# Patient Record
Sex: FEMALE | Race: WHITE
[De-identification: names, ages, dates, MRNs, and addresses within clinical notes are randomized per-mention and may not be internally consistent; named-entity substitution may affect disease eponyms.]

---

## 2017-02-20 ENCOUNTER — RECORDS - HEALTHEAST (OUTPATIENT)
Dept: ADMINISTRATIVE | Facility: OTHER | Age: 32
End: 2017-02-20

## 2017-10-11 ENCOUNTER — RECORDS - HEALTHEAST (OUTPATIENT)
Dept: ADMINISTRATIVE | Facility: OTHER | Age: 32
End: 2017-10-11

## 2018-02-07 ENCOUNTER — COMMUNICATION - HEALTHEAST (OUTPATIENT)
Dept: ADMINISTRATIVE | Facility: CLINIC | Age: 33
End: 2018-02-07

## 2018-03-08 ENCOUNTER — OFFICE VISIT - HEALTHEAST (OUTPATIENT)
Dept: ENDOCRINOLOGY | Facility: CLINIC | Age: 33
End: 2018-03-08

## 2018-03-08 DIAGNOSIS — E03.9 HYPOTHYROID: ICD-10-CM

## 2018-03-08 ASSESSMENT — MIFFLIN-ST. JEOR: SCORE: 1465.84

## 2018-03-09 ENCOUNTER — AMBULATORY - HEALTHEAST (OUTPATIENT)
Dept: ENDOCRINOLOGY | Facility: CLINIC | Age: 33
End: 2018-03-09

## 2018-03-09 DIAGNOSIS — E03.9 HYPOTHYROID: ICD-10-CM

## 2018-04-26 ENCOUNTER — COMMUNICATION - HEALTHEAST (OUTPATIENT)
Dept: ADMINISTRATIVE | Facility: CLINIC | Age: 33
End: 2018-04-26

## 2018-05-14 ENCOUNTER — COMMUNICATION - HEALTHEAST (OUTPATIENT)
Dept: TELEHEALTH | Facility: CLINIC | Age: 33
End: 2018-05-14

## 2018-05-14 ENCOUNTER — COMMUNICATION - HEALTHEAST (OUTPATIENT)
Dept: ENDOCRINOLOGY | Facility: CLINIC | Age: 33
End: 2018-05-14

## 2018-05-14 ENCOUNTER — OFFICE VISIT - HEALTHEAST (OUTPATIENT)
Dept: ENDOCRINOLOGY | Facility: CLINIC | Age: 33
End: 2018-05-14

## 2018-05-14 DIAGNOSIS — E03.9 HYPOTHYROID: ICD-10-CM

## 2018-05-14 LAB
CALCIUM SERPL-MCNC: 9.8 MG/DL (ref 8.5–10.5)
CREAT SERPL-MCNC: 0.65 MG/DL (ref 0.6–1.1)
GFR SERPL CREATININE-BSD FRML MDRD: >60 ML/MIN/1.73M2
HCG UR QL: NEGATIVE
POTASSIUM BLD-SCNC: 4 MMOL/L (ref 3.5–5)
SP GR UR STRIP: 1.01 (ref 1–1.03)
T3 SERPL-MCNC: 77 NG/DL (ref 45–175)
T4 FREE SERPL-MCNC: 0.7 NG/DL (ref 0.7–1.8)
TSH SERPL DL<=0.005 MIU/L-ACNC: 12 UIU/ML (ref 0.3–5)

## 2018-05-14 ASSESSMENT — MIFFLIN-ST. JEOR: SCORE: 1448.15

## 2018-05-15 ENCOUNTER — COMMUNICATION - HEALTHEAST (OUTPATIENT)
Dept: ENDOCRINOLOGY | Facility: CLINIC | Age: 33
End: 2018-05-15

## 2018-05-15 LAB — 25(OH)D3 SERPL-MCNC: 28.6 NG/ML (ref 30–80)

## 2018-05-16 ENCOUNTER — COMMUNICATION - HEALTHEAST (OUTPATIENT)
Dept: ENDOCRINOLOGY | Facility: CLINIC | Age: 33
End: 2018-05-16

## 2018-05-23 ENCOUNTER — COMMUNICATION - HEALTHEAST (OUTPATIENT)
Dept: ENDOCRINOLOGY | Facility: CLINIC | Age: 33
End: 2018-05-23

## 2018-05-23 DIAGNOSIS — E03.9 HYPOTHYROID: ICD-10-CM

## 2018-05-24 ENCOUNTER — COMMUNICATION - HEALTHEAST (OUTPATIENT)
Dept: ENDOCRINOLOGY | Facility: CLINIC | Age: 33
End: 2018-05-24

## 2018-05-25 ENCOUNTER — COMMUNICATION - HEALTHEAST (OUTPATIENT)
Dept: ENDOCRINOLOGY | Facility: CLINIC | Age: 33
End: 2018-05-25

## 2018-05-29 ENCOUNTER — AMBULATORY - HEALTHEAST (OUTPATIENT)
Dept: ENDOCRINOLOGY | Facility: CLINIC | Age: 33
End: 2018-05-29

## 2018-05-29 DIAGNOSIS — F43.10 PTSD (POST-TRAUMATIC STRESS DISORDER): ICD-10-CM

## 2018-05-30 ENCOUNTER — AMBULATORY - HEALTHEAST (OUTPATIENT)
Dept: ENDOCRINOLOGY | Facility: CLINIC | Age: 33
End: 2018-05-30

## 2018-05-30 DIAGNOSIS — F43.10 PTSD (POST-TRAUMATIC STRESS DISORDER): ICD-10-CM

## 2018-05-31 ENCOUNTER — AMBULATORY - HEALTHEAST (OUTPATIENT)
Dept: ENDOCRINOLOGY | Facility: CLINIC | Age: 33
End: 2018-05-31

## 2018-06-21 ENCOUNTER — COMMUNICATION - HEALTHEAST (OUTPATIENT)
Dept: ENDOCRINOLOGY | Facility: CLINIC | Age: 33
End: 2018-06-21

## 2018-06-26 ENCOUNTER — COMMUNICATION - HEALTHEAST (OUTPATIENT)
Dept: ADMINISTRATIVE | Facility: CLINIC | Age: 33
End: 2018-06-26

## 2018-06-28 ENCOUNTER — OFFICE VISIT - HEALTHEAST (OUTPATIENT)
Dept: ENDOCRINOLOGY | Facility: CLINIC | Age: 33
End: 2018-06-28

## 2018-06-28 ENCOUNTER — HOSPITAL ENCOUNTER (OUTPATIENT)
Dept: ULTRASOUND IMAGING | Facility: CLINIC | Age: 33
Discharge: HOME | End: 2018-06-28
Attending: INTERNAL MEDICINE

## 2018-06-28 DIAGNOSIS — E03.9 HYPOTHYROID: ICD-10-CM

## 2018-06-28 LAB
CALCIUM SERPL-MCNC: 10 MG/DL (ref 8.5–10.5)
CREAT SERPL-MCNC: 0.75 MG/DL (ref 0.6–1.1)
GFR SERPL CREATININE-BSD FRML MDRD: >60 ML/MIN/1.73M2
POTASSIUM BLD-SCNC: 4.4 MMOL/L (ref 3.5–5)
T3 SERPL-MCNC: 71 NG/DL (ref 45–175)
T4 FREE SERPL-MCNC: 0.6 NG/DL (ref 0.7–1.8)
THYROID PEROXIDASE ANTIBODIES - HISTORICAL: 22.3 IU/ML (ref 0–5.6)
TSH SERPL DL<=0.005 MIU/L-ACNC: 28.97 UIU/ML (ref 0.3–5)

## 2018-06-28 ASSESSMENT — MIFFLIN-ST. JEOR: SCORE: 1416.86

## 2018-06-29 LAB
25(OH)D3 SERPL-MCNC: 30.1 NG/ML (ref 30–80)
25(OH)D3 SERPL-MCNC: 30.1 NG/ML (ref 30–80)

## 2018-06-30 LAB
THYROGLOB AB SERPL-ACNC: <0.9 IU/ML (ref 0–4)
THYROGLOB SERPL-MCNC: ABNORMAL NG/ML (ref 1.3–31.8)
THYROGLOBULIN, SERUM OR: 0.4 NG/ML (ref 1.3–31.8)
TSH RECEP AB SER-ACNC: <0.9 IU/L

## 2018-07-03 LAB — TSI ACT/NOR SER: 100 %

## 2018-07-06 ENCOUNTER — COMMUNICATION - HEALTHEAST (OUTPATIENT)
Dept: ENDOCRINOLOGY | Facility: CLINIC | Age: 33
End: 2018-07-06

## 2018-07-06 ENCOUNTER — AMBULATORY - HEALTHEAST (OUTPATIENT)
Dept: ENDOCRINOLOGY | Facility: CLINIC | Age: 33
End: 2018-07-06

## 2018-07-06 DIAGNOSIS — E03.9 HYPOTHYROID: ICD-10-CM

## 2018-07-06 DIAGNOSIS — E05.90 HYPERTHYROIDISM: ICD-10-CM

## 2018-07-13 ENCOUNTER — HOSPITAL ENCOUNTER (OUTPATIENT)
Dept: ULTRASOUND IMAGING | Facility: CLINIC | Age: 33
Discharge: HOME | End: 2018-07-13
Attending: INTERNAL MEDICINE

## 2018-07-13 DIAGNOSIS — E05.90 HYPERTHYROIDISM: ICD-10-CM

## 2018-07-31 ENCOUNTER — COMMUNICATION - HEALTHEAST (OUTPATIENT)
Dept: ENDOCRINOLOGY | Facility: CLINIC | Age: 33
End: 2018-07-31

## 2018-08-28 ENCOUNTER — COMMUNICATION - HEALTHEAST (OUTPATIENT)
Dept: ENDOCRINOLOGY | Facility: CLINIC | Age: 33
End: 2018-08-28

## 2018-08-28 DIAGNOSIS — E03.9 HYPOTHYROID: ICD-10-CM

## 2018-09-04 ENCOUNTER — AMBULATORY - HEALTHEAST (OUTPATIENT)
Dept: LAB | Facility: CLINIC | Age: 33
End: 2018-09-04

## 2018-09-04 DIAGNOSIS — E03.9 HYPOTHYROID: ICD-10-CM

## 2018-09-04 LAB
T3 SERPL-MCNC: 115 NG/DL (ref 45–175)
T4 FREE SERPL-MCNC: 0.9 NG/DL (ref 0.7–1.8)
TSH SERPL DL<=0.005 MIU/L-ACNC: 6.37 UIU/ML (ref 0.3–5)

## 2018-09-12 ENCOUNTER — COMMUNICATION - HEALTHEAST (OUTPATIENT)
Dept: ENDOCRINOLOGY | Facility: CLINIC | Age: 33
End: 2018-09-12

## 2018-09-12 DIAGNOSIS — E03.9 HYPOTHYROID: ICD-10-CM

## 2018-09-27 ENCOUNTER — COMMUNICATION - HEALTHEAST (OUTPATIENT)
Dept: ENDOCRINOLOGY | Facility: CLINIC | Age: 33
End: 2018-09-27

## 2018-09-27 DIAGNOSIS — E03.9 HYPOTHYROID: ICD-10-CM

## 2018-11-12 ENCOUNTER — COMMUNICATION - HEALTHEAST (OUTPATIENT)
Dept: ENDOCRINOLOGY | Facility: CLINIC | Age: 33
End: 2018-11-12

## 2018-11-12 DIAGNOSIS — E03.9 HYPOTHYROID: ICD-10-CM

## 2018-11-13 ENCOUNTER — COMMUNICATION - HEALTHEAST (OUTPATIENT)
Dept: ADMINISTRATIVE | Facility: CLINIC | Age: 33
End: 2018-11-13

## 2018-11-14 ENCOUNTER — COMMUNICATION - HEALTHEAST (OUTPATIENT)
Dept: LAB | Facility: CLINIC | Age: 33
End: 2018-11-14

## 2018-11-14 DIAGNOSIS — E03.9 HYPOTHYROID: ICD-10-CM

## 2018-11-20 ENCOUNTER — AMBULATORY - HEALTHEAST (OUTPATIENT)
Dept: LAB | Facility: CLINIC | Age: 33
End: 2018-11-20

## 2018-11-20 DIAGNOSIS — E03.9 HYPOTHYROID: ICD-10-CM

## 2018-11-20 LAB
T3 SERPL-MCNC: 123 NG/DL (ref 45–175)
T4 FREE SERPL-MCNC: 0.8 NG/DL (ref 0.7–1.8)
TSH SERPL DL<=0.005 MIU/L-ACNC: 7.02 UIU/ML (ref 0.3–5)

## 2018-11-21 LAB
25(OH)D3 SERPL-MCNC: 25.6 NG/ML (ref 30–80)
25(OH)D3 SERPL-MCNC: 25.6 NG/ML (ref 30–80)

## 2018-11-27 ENCOUNTER — COMMUNICATION - HEALTHEAST (OUTPATIENT)
Dept: ADMINISTRATIVE | Facility: CLINIC | Age: 33
End: 2018-11-27

## 2018-12-06 ENCOUNTER — COMMUNICATION - HEALTHEAST (OUTPATIENT)
Dept: ENDOCRINOLOGY | Facility: CLINIC | Age: 33
End: 2018-12-06

## 2018-12-06 DIAGNOSIS — E03.9 HYPOTHYROID: ICD-10-CM

## 2018-12-31 ENCOUNTER — OFFICE VISIT - HEALTHEAST (OUTPATIENT)
Dept: ENDOCRINOLOGY | Facility: CLINIC | Age: 33
End: 2018-12-31

## 2018-12-31 DIAGNOSIS — E03.9 ACQUIRED HYPOTHYROIDISM: ICD-10-CM

## 2018-12-31 LAB
T3 SERPL-MCNC: 105 NG/DL (ref 45–175)
T4 FREE SERPL-MCNC: 0.6 NG/DL (ref 0.7–1.8)
TSH SERPL DL<=0.005 MIU/L-ACNC: 28.47 UIU/ML (ref 0.3–5)

## 2018-12-31 ASSESSMENT — MIFFLIN-ST. JEOR: SCORE: 1482.41

## 2019-01-03 ENCOUNTER — COMMUNICATION - HEALTHEAST (OUTPATIENT)
Dept: ENDOCRINOLOGY | Facility: CLINIC | Age: 34
End: 2019-01-03

## 2019-01-03 DIAGNOSIS — E03.9 ACQUIRED HYPOTHYROIDISM: ICD-10-CM

## 2019-02-26 ENCOUNTER — COMMUNICATION - HEALTHEAST (OUTPATIENT)
Dept: SCHEDULING | Facility: CLINIC | Age: 34
End: 2019-02-26

## 2019-04-26 ENCOUNTER — HOSPITAL ENCOUNTER (OUTPATIENT)
Dept: LAB | Age: 34
Setting detail: SPECIMEN
Discharge: HOME | End: 2019-04-26

## 2019-04-26 ENCOUNTER — AMBULATORY - HEALTHEAST (OUTPATIENT)
Dept: LAB | Facility: CLINIC | Age: 34
End: 2019-04-26

## 2019-04-26 DIAGNOSIS — E03.9 ACQUIRED HYPOTHYROIDISM: ICD-10-CM

## 2019-04-26 LAB
T3 SERPL-MCNC: 102 NG/DL (ref 45–175)
T4 FREE SERPL-MCNC: 1 NG/DL (ref 0.7–1.8)
TSH SERPL DL<=0.005 MIU/L-ACNC: 8.15 UIU/ML (ref 0.3–5)

## 2019-04-29 ENCOUNTER — COMMUNICATION - HEALTHEAST (OUTPATIENT)
Dept: ENDOCRINOLOGY | Facility: CLINIC | Age: 34
End: 2019-04-29

## 2019-04-29 DIAGNOSIS — E03.8 OTHER SPECIFIED HYPOTHYROIDISM: ICD-10-CM

## 2019-06-04 ENCOUNTER — COMMUNICATION - HEALTHEAST (OUTPATIENT)
Dept: ENDOCRINOLOGY | Facility: CLINIC | Age: 34
End: 2019-06-04

## 2019-06-13 ENCOUNTER — TELEPHONE (OUTPATIENT)
Dept: ENDOCRINOLOGY | Facility: CLINIC | Age: 34
End: 2019-06-13

## 2019-06-13 NOTE — TELEPHONE ENCOUNTER
To schedulers: Please schedule  for lst available endocrine    Dior Benjamin MD  Endocrine triage      [facility] Call Center    Phone Message    May a detailed message be left on voicemail: yes    Reason for Call: Other: DX: Grave's disease and Dr. Sebastian Thompson from [facility] was referring pt, as he is out on an extended leave.  Referral and records were faxed, per pt.  Please follow up with pt.  Thank you.     Action Taken: Message routed to:  [facility] ([facility]): [facility]

## 2019-07-24 ENCOUNTER — DOCUMENTATION ONLY (OUTPATIENT)
Dept: CARE COORDINATION | Facility: CLINIC | Age: 34
End: 2019-07-24

## 2019-07-27 NOTE — TELEPHONE ENCOUNTER
RECORDS RECEIVED FROM: [facility], pt was followed by outside Endo and now referring out   DATE RECEIVED: 8-7   NOTES (FOR ALL VISITS) STATUS DETAILS   OFFICE NOTES from referring provider [facility] 6-4-19 telephone encounter   OFFICE NOTES from other specialist [facility] 12-31-18   ED NOTES [facility] 9-17-11   OPERATIVE REPORT  (thyroid, pituitary, adrenal, parathyroid) N/A    MEDICATION LIST N/A    IMAGING      DEXASCAN Received [facility]:  DX Bone Density 8/7/12   MRI (BRAIN) N/A    XR (Chest) Received [facility]:  XR Chest 9/17/11   CT (HEAD/NECK/CHEST/ABDOMEN) N/A    NUCLEAR  Received [facility]:  NM Thyroid Uptake 8/5/11   ULTRASOUND (HEAD/NECK) Received  [facility]:  US Thyroid 6/28/18  US Neck 7/17/18   LABS     DIABETES: HBGA1C, CREATININE, FASTING LIPIDS, MICROALBUMIN URINE, POTASSIUM, TSH, T4    THYROID: TSH, T4, CBC, THYRODLONULIN, TOTAL T3, FREE T4, CALCITONIN, CEA [facility] Everywhere   4-26-19        Action    Action Taken 7-27: Requested DXA 8-7-12 from [facility]  7-27: Requested CXR 9-18-1 from [facility]  7-27: Requested NM Thyroid 8-5-11 from [facility]  7-27: Requested US neck 7-17-18 from   7-27: Requested US thyroid 6-28-18 from

## 2019-08-06 NOTE — TELEPHONE ENCOUNTER
Phone Call:     [facility]   Outcome Called to have images pushed. Per Ashley, images were already pushed on 7/29 however we did not receive them. Ashley will re-push images

## 2019-08-07 ENCOUNTER — PRE VISIT (OUTPATIENT)
Dept: ENDOCRINOLOGY | Facility: CLINIC | Age: 34
End: 2019-08-07

## 2019-08-07 ENCOUNTER — OFFICE VISIT (OUTPATIENT)
Dept: ENDOCRINOLOGY | Facility: CLINIC | Age: 34
End: 2019-08-07
Payer: COMMERCIAL

## 2019-08-07 ENCOUNTER — RECORDS - HEALTHEAST (OUTPATIENT)
Dept: ADMINISTRATIVE | Facility: OTHER | Age: 34
End: 2019-08-07

## 2019-08-07 DIAGNOSIS — F32.A DEPRESSION, UNSPECIFIED DEPRESSION TYPE: ICD-10-CM

## 2019-08-07 DIAGNOSIS — Z86.39 HISTORY OF GRAVES' DISEASE: ICD-10-CM

## 2019-08-07 DIAGNOSIS — R53.83 FATIGUE, UNSPECIFIED TYPE: ICD-10-CM

## 2019-08-07 DIAGNOSIS — F32.81 PREMENSTRUAL DYSPHORIC DISORDER: ICD-10-CM

## 2019-08-07 DIAGNOSIS — E89.0 POSTABLATIVE HYPOTHYROIDISM: ICD-10-CM

## 2019-08-07 DIAGNOSIS — E89.0 POSTABLATIVE HYPOTHYROIDISM: Primary | ICD-10-CM

## 2019-08-07 LAB
CALCIUM SERPL-MCNC: 9.2 MG/DL (ref 8.5–10.1)
CORTIS SERPL-MCNC: 14.1 UG/DL (ref 4–22)
FERRITIN SERPL-MCNC: 71 NG/ML (ref 12–150)
HBA1C MFR BLD: 5.5 % (ref 0–5.6)
HBA1C MFR BLD: 5.5 % (ref 0–5.6)
T4 FREE SERPL-MCNC: 0.94 NG/DL (ref 0.76–1.46)
TSH SERPL DL<=0.005 MIU/L-ACNC: 28.65 MU/L (ref 0.4–4)

## 2019-08-07 RX ORDER — SACCHAROMYCES BOULARDII 250 MG
250 CAPSULE ORAL DAILY
COMMUNITY

## 2019-08-07 RX ORDER — LEVONORGESTREL/ETHIN.ESTRADIOL 0.1-0.02MG
1 TABLET ORAL
COMMUNITY
Start: 2019-06-18

## 2019-08-07 RX ORDER — ARIPIPRAZOLE 2 MG/1
2 TABLET ORAL
COMMUNITY
End: 2019-12-10

## 2019-08-07 RX ORDER — LEVOTHYROXINE SODIUM 125 UG/1
TABLET ORAL
COMMUNITY
Start: 2019-02-13 | End: 2019-08-08 | Stop reason: DRUGHIGH

## 2019-08-07 ASSESSMENT — ENCOUNTER SYMPTOMS
POLYDIPSIA: 1
BOWEL INCONTINENCE: 0
POOR WOUND HEALING: 0
NECK PAIN: 0
DIZZINESS: 1
POLYPHAGIA: 1
SNORES LOUDLY: 1
COUGH: 0
TREMORS: 0
TROUBLE SWALLOWING: 1
SPUTUM PRODUCTION: 0
NAIL CHANGES: 0
NIGHT SWEATS: 1
HOARSE VOICE: 1
FATIGUE: 1
EYE REDNESS: 1
RECTAL PAIN: 0
BLOOD IN STOOL: 1
ABDOMINAL PAIN: 1
NUMBNESS: 0
PARALYSIS: 0
VOMITING: 0
LOSS OF CONSCIOUSNESS: 0
HEMOPTYSIS: 0
HEADACHES: 1
MEMORY LOSS: 1
TASTE DISTURBANCE: 1
JAUNDICE: 0
MUSCLE WEAKNESS: 1
NECK MASS: 0
HALLUCINATIONS: 0
FEVER: 0
INSOMNIA: 1
DEPRESSION: 1
CHILLS: 0
JOINT SWELLING: 0
DIFFICULTY URINATING: 1
BLOATING: 1
SINUS CONGESTION: 1
SKIN CHANGES: 1
ARTHRALGIAS: 1
WEIGHT GAIN: 1
INCREASED ENERGY: 1
DIARRHEA: 1
CONSTIPATION: 1
DISTURBANCES IN COORDINATION: 1
HEMATURIA: 0
EYE IRRITATION: 1
DYSPNEA ON EXERTION: 1
DECREASED CONCENTRATION: 1
SHORTNESS OF BREATH: 1
SPEECH CHANGE: 0
SORE THROAT: 1
MYALGIAS: 1
ALTERED TEMPERATURE REGULATION: 1
NERVOUS/ANXIOUS: 1
EYE WATERING: 1
HEARTBURN: 1
EYE PAIN: 0
SMELL DISTURBANCE: 1
NAUSEA: 1
SINUS PAIN: 1
WHEEZING: 1
DOUBLE VISION: 0
PANIC: 1
COUGH DISTURBING SLEEP: 0
DECREASED LIBIDO: 1
DECREASED APPETITE: 0
STIFFNESS: 1
BACK PAIN: 0
MUSCLE CRAMPS: 1
SEIZURES: 0
WEAKNESS: 1
DYSURIA: 1
HOT FLASHES: 1
FLANK PAIN: 0
TINGLING: 1
WEIGHT LOSS: 0
POSTURAL DYSPNEA: 1

## 2019-08-07 ASSESSMENT — PATIENT HEALTH QUESTIONNAIRE - PHQ9: SUM OF ALL RESPONSES TO PHQ QUESTIONS 1-9: 16

## 2019-08-07 ASSESSMENT — PAIN SCALES - GENERAL: PAINLEVEL: MODERATE PAIN (4)

## 2019-08-07 ASSESSMENT — MIFFLIN-ST. JEOR: SCORE: 1484.7

## 2019-08-07 NOTE — TELEPHONE ENCOUNTER
Imaging Received  [facility]   Image Type (x): Disc:   PACS: x   Exam Date/Name US Thyroid 6/28/18  US Neck 7/17/18 Comments: Images resovled in PACS

## 2019-08-07 NOTE — PATIENT INSTRUCTIONS
I recommend you schedule a follow-up with Dr Ortez.   I will send the results through [facility]    Information for patients on Levo-thyroxine      The thyroid hormone, Levo-thyroxine (L-T4) is one of the main hormones normally produced by the thyroid.  The drug is identical in chemical structure to the natural product.  Levothyroxine is used to treat hypothyroidism (underactive thyroid).  Sometimes it is used even when the thyroid is not underactive, to treat a goiter (enlarged thyroid) or a thyroid nodule.  For patients with a history of thyroid removal, L-T4 is used to replace the deficiency created by the surgery.    The purpose of giving L-T4 to treat hypothyroidism is to make up for the thyroid hormone previously produced by your thyroid before it failed.  Depending on the extent of your thyroid failure, you may require a higher or a lower dose of L-T4.  The goal is to make your blood level of TSH (a hormone made by the master gland, the pituitary, the gland which controls and judges the thyroid) normal.    This drug is usually well-tolerated and safe but it is important to take the proper dose for YOU.  This involves periodic measurements of TSH, usually within 1-2 months of a dose change.  You should be off biotin containing supplements for at least one week prior to thyroid blood tests.    You should alert your doctor if you have signs you are getting too much L-T4.  These include excessive nervousness, heart fluttering or skipping beats, diarrhea, or feeling too hot and/or sweaty.      There are many brand names for Levo-thyroxine (L-T4), including Synthroid, Levoxyl, Levothroid, Unithroid, Tirosint and others.  Changing from one brand name thyroid hormone product to another or to a generic product (all generics are called levothyroxine) can cause changes in your thyroid hormone balance, even if the dose stays the same.  Since generic products can come from many different companies (such as [employer], [employer],  [employer] and others), there may be less consistency among the different generic preparations.  The decision to change brands or to change to a generic product must be made with your physician or other caregiver.  Follow-up testing should be arranged to monitor for any needed adjustments.  Monitoring may need to be more frequent if you always receive a generic thyroid hormone product.    For proper thyroid hormone balance the dose of thyroid hormone in your pills must be precise and consistent.    Be sure to take the medication as prescribed on an empty stomach, and at least 4 hours apart from calcium supplements, iron and soy products.  Store the medication away from severe heat and humidity.    You should be OFF any biotin containing supplements at least 7 days prior to thyroid lab draws.       Many insurance companies and other providers charge higher co-payments for brand name medications.  Since brand name L-T4 is not very expensive, be sure to ask if the actual cost of buying the medication is less than the co-payment.  In some instances the charge for a co-payment may be greater than buying the drug outright, especially if the prescription needs to be refilled every 30 days.    Using a pill tray can help you keep track of your medication.  Specifically, if you get a pill tray that has all days of the week (Sunday-Saturday) and also 4 boxes for each day (often labeled as breafkast, lunch, dinner and bedtime) you can use the box to fill your once/day  pills for a whole month.  If you miss a levothyroxine or vitamin D dose you can take 2 the next day.        For future reference:  Normal thyroid levels are important for pregnancy.    You should confirm normal thyroid levels immediately upon diagnosis of pregnancy.    Levothyroxine dose requirements often increase with pregnancy.    It is routine to check thyroid levels frequently during pregnancy    ________________    Thank you for choosing [facility] for your health care needs. Below is some information for patients who are interested in having their follow-up visit with a physician by telephone. In some cases, a telephone visit can be an effective and convenient way to manage your follow-up care. Choosing a telephone visit rather than a face to face visit for your follow-up care is a decision that you and your physician can make together to ensure it meets all of your needs.  A face to face visit is always an available option, if you choose to do so.     We want to make sure you have all of the information you need about the telephone visit option and answer all of your questions before you decide to schedule a telephone follow-up visit. If you have any questions, you may talk to a staff member or our financial counselor at [phone].    1.  General overview  Our clinic sees patients for a variety of conditions and concerns. A face to face visit with your doctor is required for any new concerns or for your initial visit. If you and your doctor decide that a follow up visit by telephone is appropriate, you may decide to opt for a telephone visit.     2.  Billing and insurance coverage  There is a charge for telephone visits, similar to the charge for an in-person visit. Your bill is based on the amount of time you and your physician are on the phone. We will bill each visit to your insurance company (just like your other medical visits), and you will be responsible for any costs not paid by your insurance company. The charge may be denied by your insurance company, in which case you will be responsible for the entire amount billed. The decision to cover the cost is determined by your insurance company. If you want to know what your insurance company will cover, we encourage you to contact them to determine your coverage. The codes below are the codes we use when billing for telephone visits and the associated charges. This may help you  work with your insurance company to determine your benefits.   Billing CPT codes for Telephone visits    45677 ($30), 92024 ($35), 36365 ($40)     3. Updating your consent form  You may get contacted by a staff member about updating your consent form. If it needs updating prior to your telephone visit, please visit the link below, print it and fill it out and return it to us at [facility], Mail Code [id number], [address] [place] [address].   [email or web address]

## 2019-08-07 NOTE — PROGRESS NOTES
"Endocrine Consult note    Attending Assessment/Plan :     Postablative hypothyroidism. 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 Labs following the appt show continued biochemical hypothyroidism.  She continues to expressed depressive symptoms and TFTS have mostly shown biochemical hypothyroidism.  She is currently on LT4 125 mcg/day since 2/19? It sounds like there has also been some changes between generic and brand Synthroid, currently on generic.  Labs following the appt show biochemical hypothyroidism  Increase LT4 to 137 mcg/day. Repeat labs in 6-8 weeks.  Standing orders start date 9/23/19  RTC 4 months    History of probable Graves    Depression score high on PHQ9 today- she is agree able to reconnect with the psychiatrist she has already been seeing, Dr Ricci Ortez.  She is already on Abilify.  Toward the beginning of our appt today, as I was trying to collect the HPI/ROS she suddenly requested \"can I see a patient representative?\".  The request was so unexpected and out of context that I wasn't clear what the rationale or problem was , which we then discussed for another 5-10 minutes, after which she allowed me to continue the interview and exam.    She had a 5-10 minute hyperventilation/ panic attack episode at the end of the appt, after I answered her question of if I had ever seen psychosis in the setting of hypothyroidism before, to which I informed her of the condition called myxedema madness, a term she had never heard.  Treating the hypothyroidism to biochemically euthyroid state is step one here.      Premenstrual symptoms (occurring during the 2 weeks of luteal phase by history) are incapacitating and a major complaint today.    She is now on OCP which has helped somewhat   Plan as per # 1.  I have also counseled her that there other options for this which might include fluoxetine " during the luteal phase of the menstrua cycle. Since she already has a psychiatrist prescribing psych med, I recommend she discuss this with Dr Ortez to confirm if this could be added to her regimen.     Obesity by BMI.  Significant weight gain since treating the hyperthyroidism but less than she is reporting.  I have counseled her on the fact we have a medical weight management program which would be an option in the future.      Dior Benjamin MD      Chief complaint:  Lisset is a 34 year old female seen in consultation at the request of Dr Sebastian Thompson ([facility]) for postablative hypothyroidism.   I have reviewed Care Everywhere including [facility] lab reports, imaging reports and provider notes as indicated.        HISTORY OF PRESENT ILLNESS  Lisset has been under the care of endocrinologists Keke Rust,  Fox Jones and Sebastian Thompson.      lst presented 7/7/11 with questions about her thyroid, based on her symptoms and family history. Goiter was noted on exam.  Labs showed TSH < 0.01, free T4 2.7. She lst saw Dr Annette Rust 7/29/11.  Thyroid was described as 40 grams.  Labs again showed thyrotoxicosis.  13I uptake was high at 42.9% and she went on to get radioactive iodine therapy on 8/5/11.  Hypothyroidism was biochemically documented by 11/18/11.  She was hospitalized on psychiatry at [facility] 3/1/12 -3/12/12 due to acute paranoid psychosis. She was discharged on Abilify and LT4 125 mc/gday with resolved psychosis.  The discharge summary does not state what LT4 dose she was on at the time of the hospital admission . TFTS during the hospitalization documented biochemical hypothyroidism.  She initially had day treatment folllow up and she has also been  followed by psychiatrist Dr Ricci Ortez,  I am unable to document she has seen Dr Ortez since 2012, a point I missed at the time of the appt.  Lisset stressed today how she had no problems, such as she has now, prior her thyroid  condition in [id number], a dramatic turning point.      She last saw Dr Thompson 12/31/18.  At that time he noted intolerance to [place] ; stopped LT4 in 11/18. Lisset denies today that she has ever stopped any of her thyroid medication.  She reiterates how the desiccated thyroid made her feel hyperthyroid again.  She cites he psych diagnosis as PTSD/ depression/ anxiety     Selected relevant labs:   10/23/09 TSH < 0.01 not ed at GYN appt  7/7/11 TSH < 0.01, free T4 2.7  7/29/11 TSH < 0.01, free T4 2.3, TPO 85.7  8/5/11 131I therapy ([facility]) 23 mCi  9/2/11 TSH < 0.01, free T4 > 7.7  9/20/11: free T3 16.46, free T4 > 7.7, TSH < 0.01  11/18/11: TSH 0.93, free T4 0.3  12/21/11 TSH 38.03, free T4 0.7  LT4 started after this -  1/20/12 TSH 6.28, T4 8.6, free T4 1.5  3/1/12 admission -to psychiatry- acute paranoid episode  TSH 15.41, free T4 1.3- discharged on LT4 125 mcg/ay  4/5/12 TSH 1.73, free T4 1.29.   LT4 reduced to 112 mcg/day due to symptoms per Dr Jones note  5/18/12 [facility] appt -on LT4 112 mcg/day  9/18/15 ([facility]): cortisol 8.3, HgbA1c 4.9%  10/6/17 ([facility]) TSH 6.91, free T4 0.91, T3 141  5/14/18 TSH 12, free T4 0.7, T3 77, 25OHD 28.6,   5/24/18 Rx [place] thyroid 60/day + 15 Mon, Wed, Fri 6/28/18: TSH 28.97, free T4 0.6, T3 71, Tg 0.4, KALEB < 0.9,  (< 122%), TRAB < 0.9  9/4/18 TSH 6.37, free T4 0.9, t3 115  9/12/18 Rx Lt4 50 Mon, Wed, Friday and 25 mcg other days  11/20/18 TSH 7.02, free T4 0.8, T3 123, vitamin D 25.6  12/6/18 Rx [place] thyroid 60/day + 15 Mon, Wed, Fri 12/31/18 TSH 28.47, free T4 0.6, T3 105- [facility] appt reporting intolerance to [place]- was on 75 MWF/60 other days ; stopped LT4 in 11/18  1/3/19 Rx LT4 125 mcg/day   2/26/19: Ca 9.4,  Rx LT4 125 mg/day  4/26/19 TSH 8.15, free T4 1.0, T3 102- appt with Dr Garcia -     She is currently taking LT4 125 mcg/day generic on the last refill but was Synthroid brand .  She says she has been on this dose since April, 2019 - she was on a lower dose  prior. With the current dose, she initially could tell but later couldn't. She has a number of concerns as indicated in the ROS, including in particularweight gain and premenstrual symptoms    Imaging   8/5/11 123I uptake ([facility])  42.9% at 24 hours, homogeneous- agree per my review of images.   8/5/11 131I therapy ([facility]) 23 mCi  8/7/12 DXA   7/1/15 US pelvis  11/10/16 US pelvis  11/11/6 Ct abdomen:   6/28/18 thyroid US ([facility]) diminutive/absent thyroid  7/17/18 neck US ([facility]): as read by me on PACs- limited images, including cine of right thyroid bed which appears to show diminutive/absent thyroid    Weights  9/20/11 139# while hyperthyroid  5/8/12 148# while hypothyroid  7/1/13: 162#  11/4/15: 167#  9/4/15 178#  11/9/16 169#  10/11/17 172#   12/31/18 181 #   Today 179#    REVIEW OF SYSTEMS  Weight is currently up  2012 gained 50# after the radioactive iodine treatment.    Eats one meal/day - can't lose weight  Increased hunger around the periods.    Around the time of periods she has increased depression, anxiety, panic attacks, fatigue  Joints ache most of the time  Memory is poor  Dizzy spells - last time was late July - ? Vertigo  Really tired around menses;   LMP late July - periods are once/month -- she just started on OCP again in mid June.  This has helped the luteal phase pain.    Bedtime 10 PM: up at 6 AM; sleeps well; wakes up nocturia x 2; tosses and turns  Cardiac: ? Pounding - right now   Respiratory: HOFFMAN - x since up 50#.   Eyes dry/ red; sometimes diplopia    Answers for HPI/ROS submitted by the patient on 8/7/2019   General Symptoms: Yes  Skin Symptoms: Yes  HENT Symptoms: Yes  EYE SYMPTOMS: Yes  HEART SYMPTOMS: No  LUNG SYMPTOMS: Yes  INTESTINAL SYMPTOMS: Yes  URINARY SYMPTOMS: Yes  GYNECOLOGIC SYMPTOMS: Yes  BREAST SYMPTOMS: No  SKELETAL SYMPTOMS: Yes  BLOOD SYMPTOMS: No  NERVOUS SYSTEM SYMPTOMS: Yes  MENTAL HEALTH SYMPTOMS: Yes  Fever: No  Loss of appetite: No  Weight  loss: No  Weight gain: Yes  Fatigue: Yes  Night sweats: Yes-- x years - intermittent   Chills: No  Increased stress: Yes  Excessive hunger: Yes  Excessive thirst: Yes  Feeling hot or cold when others believe the temperature is normal: Yes  Loss of height: No  Post-operative complications: No  Surgical site pain: No  Hallucinations: No  Change in or Loss of Energy: Yes  Hyperactivity: Yes  Confusion: Yes  Changes in hair: Yes  Changes in moles/birth marks: Yes  Itching: Yes  Rashes: No  Changes in nails: No  Acne: No  Hair in places you don't want it: No  Change in facial hair: No  Warts: No  Non-healing sores: No  Scarring: No  Flaking of skin: No  Color changes of hands/feet in cold : Yes  Sun sensitivity: Yes  Skin thickening: No  Ear pain: No  Ear discharge: No  Hearing loss: Yes  Tinnitus: Yes  Nosebleeds: No  Congestion: Yes  Sinus pain: Yes  Trouble swallowing: Yes   Voice hoarseness: Yes  Mouth sores: Yes  Sore throat: Yes  Tooth pain: No  Gum tenderness: Yes  Bleeding gums: Yes  Change in taste: Yes  Change in sense of smell: Yes  Dry mouth: Yes  Hearing aid used: No  Neck lump: No  Eye pain: No  Vision loss: Yes  Dry eyes: Yes  Watery eyes: Yes  Eye bulging: No  Double vision: No  Flashing of lights: No  Spots: Yes  Floaters: Yes  Redness: Yes  Crossed eyes: No  Tunnel Vision: No  Yellowing of eyes: No  Eye irritation: Yes  Cough: No  Sputum or phlegm: No  Coughing up blood: No  Difficulty breating or shortness of breath: Yes  Snoring: Yes  Wheezing: Yes  Difficulty breathing on exertion: Yes  Nighttime Cough: No  Difficulty breathing when lying flat: Yes  Heart burn or indigestion: Yes  Nausea: Yes  Vomiting: No  Abdominal pain: Yes  Bloating: Yes  Constipation: Yes  Diarrhea: Yes  Blood in stool: Yes  Black stools: No  Rectal or Anal pain: No  Fecal incontinence: No  Yellowing of skin or eyes: No  Vomit with blood: No  Change in stools: Yes  Trouble holding urine or incontinence: Yes  Pain or burning:  Yes  Trouble starting or stopping: Yes  Increased frequency of urination: Yes  Blood in urine: No  Decreased frequency of urination: No  Frequent nighttime urination: Yes  Flank pain: No  Difficulty emptying bladder: Yes  Back pain: No  Muscle aches: Yes  Neck pain: No  Swollen joints: No  Joint pain: Yes  Bone pain: No  Muscle cramps: Yes  Muscle weakness: Yes  Joint stiffness: Yes  Bone fracture: No  Trouble with coordination: Yes  Dizziness or trouble with balance: Yes  Fainting or black-out spells: No  Memory loss: Yes  Headache: Yes  Seizures: No  Speech problems: No  Tingling: Yes  Tremor: No  Weakness: Yes  Difficulty walking: Yes  Paralysis: No  Numbness: No  Bleeding or spotting between periods: No  Heavy or painful periods: Yes  Irregular periods: No  Vaginal discharge: No  Hot flashes: Yes  Vaginal dryness: Yes  Genital ulcers: No  Reduced libido: Yes  Painful intercourse: No  Difficulty with sexual arousal: Yes  Post-menopausal bleeding: No  Nervous or Anxious: Yes  Depression: Yes  Trouble sleeping: Yes  Trouble thinking or concentrating: Yes  Mood changes: Yes  Panic attacks: Yes  10 system ROS otherwise as per the HPI or negative    Past Medical History  Past Medical History:   Diagnosis Date     Anxiety      Depression      Hyperthyroidism 2011    131I 23 mCi 8/5/11     Postablative hypothyroidism 12/2011     Psychotic episode (H) 03/01/2012    onset while biochemically hypothyroid     PTSD (post-traumatic stress disorder)      Past Surgical History:   Procedure Laterality Date     D & C  2005    for pregnancy termination     tonsillectomy and adenoidectomy       TYMPANOSTOMY, LOCAL/TOPICAL ANESTHESIA       wisdom teeth       Medications    Current Outpatient Medications   Medication Sig Dispense Refill     ARIPiprazole (ABILIFY) 2 MG tablet Take 2 mg by mouth       levonorgestrel-ethinyl estradiol (AVIANE) 0.1-20 MG-MCG tablet Take 1 tablet by mouth       levothyroxine (SYNTHROID/LEVOTHROID) 125 MCG  "tablet        saccharomyces boulardii (FLORASTOR) 250 MG capsule Take 250 mg by mouth daily       Allergies  No Known Allergies    Family History  family history includes Diabetes in her paternal uncle; Thyroid Cancer in her paternal grandmother; Thyroid Disease in her paternal aunt and paternal uncle.    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  GENERAL :  Young woman In no apparent physical distress.    SKIN: Normal color, normal temperature, texture.  No hirsutism, alopecia or purple striae.     EYES: PERRL, EOMI, No scleral icterus,  No proptosis, conjunctival redness, stare, retraction  MOUTH: Moist, pink; pharynx clear  NECK: No visible masses. No palpable adenopathy, or masses. No carotid bruits.   THYROID:  Not palpable  RESP: Lungs clear to auscultation bilaterally  CARDIAC: Regular rate and rhythm, normal S1 S2, without murmurs, rubs or gallops    ABDOMEN: Normal bowel sounds; soft, nontender, no HSM or masses       NEURO: awake, alert, responds to questions; several different affects during the appt including initial neutral followed by ? Angry/defensive/paranoid followed by the panic attack.  Cranial nerves intact.  Moves all extremities; Gait normal.  No tremor of the outstretched hand.  DTRs   1/4 ,   EXTREMITIES: No clubbing, cyanosis or edema.    DATA REVIEW    Results for ELLY PACKER (MRN [id number]) as of 8/8/2019 11:29   Ref. Range 8/7/2019 18:06   Calcium Latest Ref Range: 8.5 - 10.1 mg/dL 9.2   Hemoglobin A1C Latest Ref Range: 0 - 5.6 % 5.5   Cortisol Serum Latest Ref Range: 4 - 22 ug/dL 14.1   Ferritin Latest Ref Range: " 12 - 150 ng/mL 71   T4 Free Latest Ref Range: 0.76 - 1.46 ng/dL 0.94   TSH Latest Ref Range: 0.40 - 4.00 mU/L 28.65 (H)     US Neck Limited7/17/2018  [facility]  Result Impression   CONCLUSION:  1.  Post thyroid ablation. Kortney graph no definable nodule to biopsy.    2.  Ill-defined residual tissue in the right thyroid bed.    3.  Nothing that I would biopsy.    4.  Imaging performed by myself and findings discussed with the patient.   Other Result Information   This result has an attachment that is not available.   Result Narrative    US NECK LIMITED  7/13/2018 2:24 PM    INDICATION: Nodular opacities seen in the thyroid bed on prior ultrasound. Possible biopsy.  TECHNIQUE: Routine.  COMPARISON: None.    FINDINGS:  Postop previous thyroid ablation. Imaging performed by myself shows no definable nodule that I would biopsy. Very subtle suggestion of some residual tissue or scarring in the right thyroid bed is not well defined.    Imaging performed by myself.       Small amount of solid tissue in the expected position of the right thyroid gland may be explained by residual thyroid tissue. Correlation with clinical history and follow-up suggested.    Other Result Information   This result has an attachment that is not available.   Result Narrative    US THYROID  6/28/2018 1:50 PM    INDICATION: Goiter  TECHNIQUE: Routine.  COMPARISON: None.    FINDINGS:  A normal thyroid gland is not visualized consistent with history of radioiodine ablation.    In the expected position of the right thyroid gland there is a circumscribed ovoid slightly hypoechoic tissue measuring 15 x 8 x 4 mm which is wider than tall and demonstrates a small amount of internal Doppler vascularity. This may be explained by   residual thyroid tissue.    No residual thyroid tissue or mass detected on the left.    No cervical lymphadenopathy.

## 2019-08-07 NOTE — LETTER
"8/7/2019       RE: Lisset Lares  [address] [facility] [place] [address]     Dear Colleague,    Thank you for referring your patient, Lisset Lares, to the [facility] ENDOCRINOLOGY at [facility]. Please see a copy of my visit note below.    Endocrine Consult note    Attending Assessment/Plan :     Postablative hypothyroidism. 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 Labs following the appt show continued biochemical hypothyroidism.  She continues to expressed depressive symptoms and TFTS have mostly shown biochemical hypothyroidism.  She is currently on LT4 125 mcg/day since 2/19? It sounds like there has also been some changes between generic and brand Synthroid, currently on generic.  Labs following the appt show biochemical hypothyroidism  Increase LT4 to 137 mcg/day. Repeat labs in 6-8 weeks.  Standing orders start date 9/23/19  RTC 4 months    History of probable Graves    Depression score high on PHQ9 today- she is agree able to reconnect with the psychiatrist she has already been seeing, Dr Ricci Ortez.  She is already on Abilify.  Toward the beginning of our appt today, as I was trying to collect the HPI/ROS she suddenly requested \"can I see a patient representative?\".  The request was so unexpected and out of context that I wasn't clear what the rationale or problem was , which we then discussed for another 5-10 minutes, after which she allowed me to continue the interview and exam.    She had a 5-10 minute hyperventilation/ panic attack episode at the end of the appt, after I answered her question of if I had ever seen psychosis in the setting of hypothyroidism before, to which I informed her of the condition called myxedema madness, a term she had never heard.  Treating the hypothyroidism to biochemically euthyroid state is step one here.  "     Premenstrual symptoms (occurring during the 2 weeks of luteal phase by history) are incapacitating and a major complaint today.    She is now on OCP which has helped somewhat   Plan as per # 1.  I have also counseled her that there other options for this which might include fluoxetine during the luteal phase of the menstrua cycle. Since she already has a psychiatrist prescribing psych med, I recommend she discuss this with Dr Ortez to confirm if this could be added to her regimen.     Obesity by BMI.  Significant weight gain since treating the hyperthyroidism but less than she is reporting.  I have counseled her on the fact we have a medical weight management program which would be an option in the future.      Dior Benjamin MD      Chief complaint:  Lisset is a 34 year old female seen in consultation at the request of Dr Sebastian Thompson ([facility]) for postablative hypothyroidism.   I have reviewed Care Everywhere including [facility] lab reports, imaging reports and provider notes as indicated.        HISTORY OF PRESENT ILLNESS  Lisset has been under the care of endocrinologists Keke Rust,  Fox Jones and Sebastian Thompson.      lst presented 7/7/11 with questions about her thyroid, based on her symptoms and family history. Goiter was noted on exam.  Labs showed TSH < 0.01, free T4 2.7. She lst saw Dr Annette Rust 7/29/11.  Thyroid was described as 40 grams.  Labs again showed thyrotoxicosis.  13I uptake was high at 42.9% and she went on to get radioactive iodine therapy on 8/5/11.  Hypothyroidism was biochemically documented by 11/18/11.  She was hospitalized on psychiatry at [facility] 3/1/12 -3/12/12 due to acute paranoid psychosis. She was discharged on Abilify and LT4 125 mc/gday with resolved psychosis.  The discharge summary does not state what LT4 dose she was on at the time of the hospital admission . TFTS during the hospitalization documented biochemical hypothyroidism.  She  initially had day treatment folllow up and she has also been  followed by psychiatrist Dr Ricci Ortez,  I am unable to document she has seen Dr Ortez since 2012, a point I missed at the time of the appt.  Lisset stressed today how she had no problems, such as she has now, prior her thyroid condition in [id number], a dramatic turning point.      She last saw Dr Thompson 12/31/18.  At that time he noted intolerance to [place] ; stopped LT4 in 11/18. Lisset denies today that she has ever stopped any of her thyroid medication.  She reiterates how the desiccated thyroid made her feel hyperthyroid again.  She cites he psych diagnosis as PTSD/ depression/ anxiety     Selected relevant labs:   10/23/09 TSH < 0.01 not ed at GYN appt  7/7/11 TSH < 0.01, free T4 2.7  7/29/11 TSH < 0.01, free T4 2.3, TPO 85.7  8/5/11 131I therapy ([facility]) 23 mCi  9/2/11 TSH < 0.01, free T4 > 7.7  9/20/11: free T3 16.46, free T4 > 7.7, TSH < 0.01  11/18/11: TSH 0.93, free T4 0.3  12/21/11 TSH 38.03, free T4 0.7  LT4 started after this -  1/20/12 TSH 6.28, T4 8.6, free T4 1.5  3/1/12 admission -to psychiatry- acute paranoid episode  TSH 15.41, free T4 1.3- discharged on LT4 125 mcg/ay  4/5/12 TSH 1.73, free T4 1.29.   LT4 reduced to 112 mcg/day due to symptoms per Dr Jones note  5/18/12 mahin Jones appt -on LT4 112 mcg/day  9/18/15 ([facility]): cortisol 8.3, HgbA1c 4.9%  10/6/17 ([facility]) TSH 6.91, free T4 0.91, T3 141  5/14/18 TSH 12, free T4 0.7, T3 77, 25OHD 28.6,   5/24/18 Rx [place] thyroid 60/day + 15 Mon, Wed, Fri 6/28/18: TSH 28.97, free T4 0.6, T3 71, Tg 0.4, KALEB < 0.9,  (< 122%), TRAB < 0.9  9/4/18 TSH 6.37, free T4 0.9, t3 115  9/12/18 Rx Lt4 50 Mon, Wed, Friday and 25 mcg other days  11/20/18 TSH 7.02, free T4 0.8, T3 123, vitamin D 25.6  12/6/18 Rx [place] thyroid 60/day + 15 Mon, Wed, Fri 12/31/18 TSH 28.47, free T4 0.6, T3 105- [facility] appt reporting intolerance to [place]- was on 75 MWF/60 other days ; stopped LT4 in 11/18  1/3/19 Rx LT4  125 mcg/day   2/26/19: Ca 9.4,  Rx LT4 125 mg/day  4/26/19 TSH 8.15, free T4 1.0, T3 102- appt with Dr Garcia -     She is currently taking LT4 125 mcg/day generic on the last refill but was Synthroid brand .  She says she has been on this dose since April, 2019 - she was on a lower dose prior. With the current dose, she initially could tell but later couldn't. She has a number of concerns as indicated in the ROS, including in particularweight gain and premenstrual symptoms    Imaging   8/5/11 123I uptake ([facility])  42.9% at 24 hours, homogeneous- agree per my review of images.   8/5/11 131I therapy ([facility]) 23 mCi  8/7/12 DXA   7/1/15 US pelvis  11/10/16 US pelvis  11/11/6 Ct abdomen:   6/28/18 thyroid US ([facility]) diminutive/absent thyroid  7/17/18 neck US ([facility]): as read by me on PACs- limited images, including cine of right thyroid bed which appears to show diminutive/absent thyroid    Weights  9/20/11 139# while hyperthyroid  5/8/12 148# while hypothyroid  7/1/13: 162#  11/4/15: 167#  9/4/15 178#  11/9/16 169#  10/11/17 172#   12/31/18 181 #   Today 179#    REVIEW OF SYSTEMS  Weight is currently up  2012 gained 50# after the radioactive iodine treatment.    Eats one meal/day - can't lose weight  Increased hunger around the periods.    Around the time of periods she has increased depression, anxiety, panic attacks, fatigue  Joints ache most of the time  Memory is poor  Dizzy spells - last time was late July - ? Vertigo  Really tired around menses;   LMP late July - periods are once/month -- she just started on OCP again in mid June.  This has helped the luteal phase pain.    Bedtime 10 PM: up at 6 AM; sleeps well; wakes up nocturia x 2; tosses and turns  Cardiac: ? Pounding - right now   Respiratory: HOFFMAN - x since up 50#.   Eyes dry/ red; sometimes diplopia  10 system ROS otherwise as per the HPI or negative    Past Medical History  Past Medical History:   Diagnosis Date     Anxiety       "Depression      Hyperthyroidism 2011    131I 23 mCi 8/5/11     Postablative hypothyroidism 12/2011     Psychotic episode (H) 03/01/2012    onset while biochemically hypothyroid     PTSD (post-traumatic stress disorder)      Past Surgical History:   Procedure Laterality Date     D & C  2005    for pregnancy termination     tonsillectomy and adenoidectomy       TYMPANOSTOMY, LOCAL/TOPICAL ANESTHESIA       wisdom teeth       Medications    Current Outpatient Medications   Medication Sig Dispense Refill     ARIPiprazole (ABILIFY) 2 MG tablet Take 2 mg by mouth       levonorgestrel-ethinyl estradiol (AVIANE) 0.1-20 MG-MCG tablet Take 1 tablet by mouth       levothyroxine (SYNTHROID/LEVOTHROID) 125 MCG tablet        saccharomyces boulardii (FLORASTOR) 250 MG capsule Take 250 mg by mouth daily       Allergies  No Known Allergies    Family History  family history includes Diabetes in her paternal uncle; Thyroid Cancer in her paternal grandmother; Thyroid Disease in her paternal aunt and paternal uncle.    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  GENERAL :  Young woman In no apparent physical distress.    SKIN: Normal color, normal temperature, texture.  No hirsutism, alopecia or purple striae.     EYES: PERRL, EOMI, No scleral icterus,  No proptosis, conjunctival redness, stare, retraction  MOUTH: Moist, pink; pharynx clear  NECK: No visible masses. No palpable adenopathy, or masses. No carotid bruits.   THYROID:  Not palpable  RESP: Lungs clear to auscultation bilaterally  CARDIAC: Regular rate and rhythm, normal S1 S2, without murmurs, rubs or " gallops    ABDOMEN: Normal bowel sounds; soft, nontender, no HSM or masses       NEURO: awake, alert, responds to questions; several different affects during the appt including initial neutral followed by ? Angry/defensive/paranoid followed by the panic attack.  Cranial nerves intact.  Moves all extremities; Gait normal.  No tremor of the outstretched hand.  DTRs   1/4 ,   EXTREMITIES: No clubbing, cyanosis or edema.    DATA REVIEW    Results for ELLY PACKER (MRN [id number]) as of 8/8/2019 11:29   Ref. Range 8/7/2019 18:06   Calcium Latest Ref Range: 8.5 - 10.1 mg/dL 9.2   Hemoglobin A1C Latest Ref Range: 0 - 5.6 % 5.5   Cortisol Serum Latest Ref Range: 4 - 22 ug/dL 14.1   Ferritin Latest Ref Range: 12 - 150 ng/mL 71   T4 Free Latest Ref Range: 0.76 - 1.46 ng/dL 0.94   TSH Latest Ref Range: 0.40 - 4.00 mU/L 28.65 (H)     US Neck Limited7/17/2018  [facility]  Result Impression   CONCLUSION:  1.  Post thyroid ablation. Kortney graph no definable nodule to biopsy.    2.  Ill-defined residual tissue in the right thyroid bed.    3.  Nothing that I would biopsy.    4.  Imaging performed by myself and findings discussed with the patient.   Other Result Information   This result has an attachment that is not available.   Result Narrative    US NECK LIMITED  7/13/2018 2:24 PM    INDICATION: Nodular opacities seen in the thyroid bed on prior ultrasound. Possible biopsy.  TECHNIQUE: Routine.  COMPARISON: None.    FINDINGS:  Postop previous thyroid ablation. Imaging performed by myself shows no definable nodule that I would biopsy. Very subtle suggestion of some residual tissue or scarring in the right thyroid bed is not well defined.    Imaging performed by myself.       Small amount of solid tissue in the expected position of the right thyroid gland may be explained by residual thyroid tissue. Correlation with clinical history and follow-up suggested.    Other Result Information   This result has an attachment that  is not available.   Result Narrative    US THYROID  6/28/2018 1:50 PM    INDICATION: Goiter  TECHNIQUE: Routine.  COMPARISON: None.    FINDINGS:  A normal thyroid gland is not visualized consistent with history of radioiodine ablation.    In the expected position of the right thyroid gland there is a circumscribed ovoid slightly hypoechoic tissue measuring 15 x 8 x 4 mm which is wider than tall and demonstrates a small amount of internal Doppler vascularity. This may be explained by   residual thyroid tissue.    No residual thyroid tissue or mass detected on the left.    No cervical lymphadenopathy.       Again, thank you for allowing me to participate in the care of your patient.      Sincerely,    Dior Benjamin MD

## 2019-08-08 PROBLEM — F32.81 PREMENSTRUAL DYSPHORIC DISORDER: Status: ACTIVE | Noted: 2019-08-08

## 2019-08-08 PROBLEM — E89.0 POSTABLATIVE HYPOTHYROIDISM: Status: ACTIVE | Noted: 2019-08-08

## 2019-08-08 PROBLEM — R53.83 FATIGUE, UNSPECIFIED TYPE: Status: ACTIVE | Noted: 2019-08-08

## 2019-08-08 PROBLEM — F32.A DEPRESSION, UNSPECIFIED DEPRESSION TYPE: Status: ACTIVE | Noted: 2019-08-08

## 2019-08-08 PROBLEM — Z86.39 HISTORY OF GRAVES' DISEASE: Status: ACTIVE | Noted: 2019-08-08

## 2019-08-08 RX ORDER — LEVOTHYROXINE SODIUM 137 UG/1
137 TABLET ORAL DAILY
Qty: 90 TABLET | Refills: 3 | Status: SHIPPED | OUTPATIENT
Start: 2019-08-08 | End: 2019-12-10

## 2019-08-12 ENCOUNTER — RECORDS - HEALTHEAST (OUTPATIENT)
Dept: HEALTH INFORMATION MANAGEMENT | Facility: CLINIC | Age: 34
End: 2019-08-12

## 2019-09-30 DIAGNOSIS — Z86.39 HISTORY OF GRAVES' DISEASE: ICD-10-CM

## 2019-09-30 DIAGNOSIS — E89.0 POSTABLATIVE HYPOTHYROIDISM: ICD-10-CM

## 2019-09-30 LAB
T4 FREE SERPL-MCNC: 1.33 NG/DL (ref 0.76–1.46)
TSH SERPL DL<=0.005 MIU/L-ACNC: 4.52 MU/L (ref 0.4–4)

## 2019-12-10 ENCOUNTER — RECORDS - HEALTHEAST (OUTPATIENT)
Dept: ADMINISTRATIVE | Facility: OTHER | Age: 34
End: 2019-12-10

## 2019-12-10 ENCOUNTER — OFFICE VISIT (OUTPATIENT)
Dept: ENDOCRINOLOGY | Facility: CLINIC | Age: 34
End: 2019-12-10
Payer: COMMERCIAL

## 2019-12-10 VITALS
DIASTOLIC BLOOD PRESSURE: 90 MMHG | BODY MASS INDEX: 32.12 KG/M2 | SYSTOLIC BLOOD PRESSURE: 122 MMHG | HEART RATE: 80 BPM | HEIGHT: 63 IN | WEIGHT: 181.3 LBS

## 2019-12-10 DIAGNOSIS — E89.0 POSTABLATIVE HYPOTHYROIDISM: Primary | ICD-10-CM

## 2019-12-10 DIAGNOSIS — E89.0 POSTABLATIVE HYPOTHYROIDISM: ICD-10-CM

## 2019-12-10 DIAGNOSIS — Z86.39 HISTORY OF GRAVES' DISEASE: ICD-10-CM

## 2019-12-10 LAB
T4 FREE SERPL-MCNC: 1.14 NG/DL (ref 0.76–1.46)
TSH SERPL DL<=0.005 MIU/L-ACNC: 7.8 MU/L (ref 0.4–4)

## 2019-12-10 RX ORDER — LEVOTHYROXINE SODIUM 137 UG/1
TABLET ORAL
Qty: 96 TABLET | Refills: 3 | Status: SHIPPED | OUTPATIENT
Start: 2019-12-10 | End: 2021-01-04

## 2019-12-10 RX ORDER — ESOMEPRAZOLE MAGNESIUM 40 MG/1
20 GRANULE, DELAYED RELEASE ORAL
COMMUNITY

## 2019-12-10 ASSESSMENT — MIFFLIN-ST. JEOR: SCORE: 1491.5

## 2019-12-10 ASSESSMENT — PAIN SCALES - GENERAL: PAINLEVEL: NO PAIN (0)

## 2019-12-10 NOTE — PROGRESS NOTES
Endocrine Consult note    Attending Assessment/Plan :     Postablative hypothyroidism. Unstable.  On LT4  137 mcg/day and feeling back to normal.  TFTS today with dose adjustment as indicated.  Treat to normal target TSH.    Addendum: labs following the appt show TSH 7.8.  I will offer the the option to increase LT4 by 1/2 tablet per WEEK to 137 * 7.5/week, divided.  If dose change is made she will require labs in 2 months.      History of probable Graves    Psych symptoms have improved/ resolved and she has self discontinued the abilify. She is also having less panic attack.  Discussed. She plans to follow on this with her PCP    Obesity by BMI.      Dior Benjamin MD      Chief complaint. 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  I have seen her once before, in 8/18.  At that time she presented on  LT4 125 mcg/day generic on the last refill but was Synthroid brand .  TSH was 28.65, free T4 0.94.   I increased the dose to 137 mcg/day. On 9/30/19 she had TSH 4.52, free T4 1.33.  She continues on 137 mcg/day written as generic LT4. Today she reports she is back to normal, has hope.     Goiter was lst noted in 7/11.   Labs showed TSH < 0.01, free T4 2.7. 13I uptake was high at 42.9% and she went on to get radioactive iodine therapy on 8/5/11.  Hypothyroidism was biochemically documented by 11/18/11.  She was hospitalized on psychiatry at [facility] 3/1/12 -3/12/12 due to acute paranoid psychosis in the setting of biochemical hypothyroidism. She was discharged on Abilify and LT4 125 mc/gday with resolved psychosis.  She tried [place] thyroid and did not tolerate it in 2018.     Selected relevant labs:   10/23/09 TSH < 0.01 not ed at GYN appt  7/7/11 TSH < 0.01, free T4 2.7  7/29/11 TSH < 0.01, free T4 2.3,  TPO 85.7  8/5/11 131I therapy ([facility]) 23 mCi  9/2/11 TSH < 0.01, free T4 > 7.7  9/20/11: free T3 16.46, free T4 > 7.7, TSH < 0.01  11/18/11: TSH 0.93, free T4 0.3  12/21/11 TSH 38.03, free T4 0.7  LT4 started after this -  1/20/12 TSH 6.28, T4 8.6, free T4 1.5  3/1/12 admission -to psychiatry- acute paranoid episode  TSH 15.41, free T4 1.3- discharged on LT4 125 mcg/ay  4/5/12 TSH 1.73, free T4 1.29.   LT4 reduced to 112 mcg/day due to symptoms per Dr Jones note  5/18/12 mahin Jones appt -on LT4 112 mcg/day  9/18/15 ([facility]): cortisol 8.3, HgbA1c 4.9%  10/6/17 ([facility]) TSH 6.91, free T4 0.91, T3 141  5/14/18 TSH 12, free T4 0.7, T3 77, 25OHD 28.6,   5/24/18 Rx [place] thyroid 60/day + 15 Mon, Wed, Fri  6/28/18: TSH 28.97, free T4 0.6, T3 71, Tg 0.4, KALEB < 0.9,  (< 122%), TRAB < 0.9  9/4/18 TSH 6.37, free T4 0.9, t3 115  9/12/18 Rx Lt4 50 Mon, Wed, Friday and 25 mcg other days  11/20/18 TSH 7.02, free T4 0.8, T3 123, vitamin D 25.6  12/6/18 Rx [place] thyroid 60/day + 15 Mon, Wed, Fri  12/31/18 TSH 28.47, free T4 0.6, T3 105- [facility] appt reporting intolerance to [place]- was on 75 MWF/60 other days ; stopped LT4 in 11/18  1/3/19 Rx LT4 125 mcg/day   2/26/19: Ca 9.4,  Rx LT4 125 mg/day  4/26/19 TSH 8.15, free T4 1.0, T3 102-    Imaging   8/5/11 123I uptake ([facility])  42.9% at 24 hours, homogeneous- agree per my review of images.   8/5/11 131I therapy ([facility]) 23 mCi  8/7/12 DXA   7/1/15 US pelvis  11/10/16 US pelvis  11/11/6 Ct abdomen:   6/28/18 thyroid US ([facility]) diminutive/absent thyroid  7/17/18 neck US ([facility]): - limited images, including cine of right thyroid bed which appears to show diminutive/absent thyroid    Weights  9/20/11 139# while hyperthyroid  5/8/12 148# while hypothyroid  7/1/13: 162#  11/4/15: 167#  9/4/15 178#  11/9/16 169#  10/11/17 172#   12/31/18 181 #   Today 179#    REVIEW OF SYSTEMS  Sleep OK most of the time.   Eyes negative  Cardiac: negative  Respiratory: negative  GI:  "celiac disease - if she eats gluten she has acute symptoms.,  Cooking at home, not taking risks  No longer has joint pain  No longer has dizzy spells  Stopped (self discontinued) abilify about 1-1.5 months ago  Insurance won't cover Dr Ortez.  She plans to follow the psych with her PCP  Apologizes for things she said last appt.      Past Medical History  Past Medical History:   Diagnosis Date     Anxiety      Depression      Hyperthyroidism 2011    131I 23 mCi 8/5/11     Postablative hypothyroidism 12/2011     Psychotic episode (H) 03/01/2012    onset while biochemically hypothyroid     PTSD (post-traumatic stress disorder)      Past Surgical History:   Procedure Laterality Date     D & C  2005    for pregnancy termination     tonsillectomy and adenoidectomy       TYMPANOSTOMY, LOCAL/TOPICAL ANESTHESIA       wisdom teeth       Medications    Current Outpatient Medications   Medication Sig Dispense Refill     Esomeprazole Magnesium 40 MG PACK Take 20 mg by mouth       levonorgestrel-ethinyl estradiol (AVIANE) 0.1-20 MG-MCG tablet Take 1 tablet by mouth       levothyroxine (SYNTHROID/LEVOTHROID) 137 MCG tablet Take 1 tablet (137 mcg) by mouth daily 90 tablet 3     saccharomyces boulardii (FLORASTOR) 250 MG capsule Take 250 mg by mouth daily       self discontinued) abilify about 1-1.5 months ago  Has another med from [facility] she takes prn panic attack - has used 3 times.     Allergies  No Known Allergies    Family History  family history includes Diabetes in her paternal uncle; Thyroid Cancer in her paternal grandmother; Thyroid Disease in her paternal aunt and paternal uncle.    Social History  Social History     Tobacco Use     Smoking status: Not on file   Substance Use Topics     Alcohol use: Not on file     Drug use: Not on file     Got a new job since last here; cooking at home a lot     Physical Exam  BP (!) 122/90   Pulse 80   Ht 1.6 m (5' 3\")   Wt 82.2 kg (181 lb 4.8 oz)   BMI 32.12 kg/m    Body mass index " is 32.12 kg/m .  GENERAL :  Young woman In no apparent physical distress.    SKIN: Normal color, normal temperature, texture.    EYES: PER,  No scleral icterus,  No proptosis, conjunctival redness, stare, retraction  NECK: No visible masses. No palpable adenopathy, or masses.   THYROID:  Not palpable  RESP: Lungs clear to auscultation bilaterally  CARDIAC: Regular rate and rhythm, normal S1 S2, without murmurs, rubs or gallops        NEURO: awake, alert, responds to questions;Moves all extremities; Gait normal.  No tremor of the outstretched hand.  DTRs   1/4 ,   EXTREMITIES: No clubbing, cyanosis or edema.    DATA REVIEW    ENDO THYROID LABS-[facility] Latest Ref Rng & Units 12/10/2019 9/30/2019   TSH 0.40 - 4.00 mU/L 7.80 (H) 4.52 (H)   T4 FREE 0.76 - 1.46 ng/dL 1.14 1.33     ENDO THYROID LABS-[facility] Latest Ref Rng & Units 8/7/2019   TSH 0.40 - 4.00 mU/L 28.65 (H)   T4 FREE 0.76 - 1.46 ng/dL 0.94

## 2019-12-10 NOTE — LETTER
12/10/2019       RE: Lisset Lares  [address]  [facility] [address]     Dear Colleague,    Thank you for referring your patient, Lisset Lares, to the [facility] at [facility]. Please see a copy of my visit note below.    Endocrine Consult note    Attending Assessment/Plan :     Postablative hypothyroidism. Unstable.  On LT4  137 mcg/day and feeling back to normal.  TFTS today with dose adjustment as indicated.  Treat to normal target TSH.    Addendum: labs following the appt show TSH 7.8.  I will offer the the option to increase LT4 by 1/2 tablet per WEEK to 137 * 7.5/week, divided.  If dose change is made she will require labs in 2 months.      History of probable Graves    Psych symptoms have improved/ resolved and she has self discontinued the abilify. She is also having less panic attack.  Discussed. She plans to follow on this with her PCP    Obesity by BMI.      Dior Benjamin MD      Chief complaint. 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  I have seen her once before, in 8/18.  At that time she presented on  LT4 125 mcg/day generic on the last refill but was Synthroid brand .  TSH was 28.65, free T4 0.94.   I increased the dose to 137 mcg/day. On 9/30/19 she had TSH 4.52, free T4 1.33.  She continues on 137 mcg/day written as generic LT4. Today she reports she is back to normal, has hope.     Goiter was lst noted in 7/11.   Labs showed TSH < 0.01, free T4 2.7. 13I uptake was high at 42.9% and she went on to get radioactive iodine therapy on 8/5/11.  Hypothyroidism was biochemically documented by 11/18/11.  She was hospitalized on psychiatry at [facility] 3/1/12 -3/12/12 due to acute paranoid psychosis in the setting of biochemical  hypothyroidism. She was discharged on Abilify and LT4 125 mc/gday with resolved psychosis.  She tried [place] thyroid and did not tolerate it in 2018.     Selected relevant labs:   10/23/09 TSH < 0.01 not ed at GYN appt  7/7/11 TSH < 0.01, free T4 2.7  7/29/11 TSH < 0.01, free T4 2.3, TPO 85.7  8/5/11 131I therapy ([facility]) 23 mCi  9/2/11 TSH < 0.01, free T4 > 7.7  9/20/11: free T3 16.46, free T4 > 7.7, TSH < 0.01  11/18/11: TSH 0.93, free T4 0.3  12/21/11 TSH 38.03, free T4 0.7  LT4 started after this -  1/20/12 TSH 6.28, T4 8.6, free T4 1.5  3/1/12 admission -to psychiatry- acute paranoid episode  TSH 15.41, free T4 1.3- discharged on LT4 125 mcg/ay  4/5/12 TSH 1.73, free T4 1.29.   LT4 reduced to 112 mcg/day due to symptoms per Dr Jones note  5/18/12 mahin Jones appt -on LT4 112 mcg/day  9/18/15 ([facility]): cortisol 8.3, HgbA1c 4.9%  10/6/17 ([facility]) TSH 6.91, free T4 0.91, T3 141  5/14/18 TSH 12, free T4 0.7, T3 77, 25OHD 28.6,   5/24/18 Rx [place] thyroid 60/day + 15 Mon, Wed, Fri 6/28/18: TSH 28.97, free T4 0.6, T3 71, Tg 0.4, KALEB < 0.9,  (< 122%), TRAB < 0.9  9/4/18 TSH 6.37, free T4 0.9, t3 115  9/12/18 Rx Lt4 50 Mon, Wed, Friday and 25 mcg other days  11/20/18 TSH 7.02, free T4 0.8, T3 123, vitamin D 25.6  12/6/18 Rx [place] thyroid 60/day + 15 Mon, Wed, Fri  12/31/18 TSH 28.47, free T4 0.6, T3 105- [facility] appt reporting intolerance to [place]- was on 75 MWF/60 other days ; stopped LT4 in 11/18  1/3/19 Rx LT4 125 mcg/day   2/26/19: Ca 9.4,  Rx LT4 125 mg/day  4/26/19 TSH 8.15, free T4 1.0, T3 102-    Imaging   8/5/11 123I uptake ([facility])  42.9% at 24 hours, homogeneous- agree per my review of images.   8/5/11 131I therapy ([facility]) 23 mCi  8/7/12 DXA   7/1/15 US pelvis  11/10/16 US pelvis  11/11/6 Ct abdomen:   [facility]utive[facility]abs[facility] thyroid  7[facility]17/18 [facility] - limited images, including cine of right thyroid bed which appears to show diminutive/absent  thyroid    Weights  9/20/11 139# while hyperthyroid  5/8/12 148# while hypothyroid  7/1/13: 162#  11/4/15: 167#  9/4/15 178#  11/9/16 169#  10/11/17 172#   12/31/18 181 #   Today 179#    REVIEW OF SYSTEMS  Sleep OK most of the time.   Eyes negative  Cardiac: negative  Respiratory: negative  GI: celiac disease - if she eats gluten she has acute symptoms.,  Cooking at home, not taking risks  No longer has joint pain  No longer has dizzy spells  Stopped (self discontinued) abilify about 1-1.5 months ago  Insurance won't cover Dr Ortez.  She plans to follow the psych with her PCP  Apologizes for things she said last appt.      Past Medical History  Past Medical History:   Diagnosis Date     Anxiety      Depression      Hyperthyroidism 2011    131I 23 mCi 8/5/11     Postablative hypothyroidism 12/2011     Psychotic episode (H) 03/01/2012    onset while biochemically hypothyroid     PTSD (post-traumatic stress disorder)      Past Surgical History:   Procedure Laterality Date     D & C  2005    for pregnancy termination     tonsillectomy and adenoidectomy       TYMPANOSTOMY, LOCAL/TOPICAL ANESTHESIA       wisdom teeth       Medications    Current Outpatient Medications   Medication Sig Dispense Refill     Esomeprazole Magnesium 40 MG PACK Take 20 mg by mouth       levonorgestrel-ethinyl estradiol (AVIANE) 0.1-20 MG-MCG tablet Take 1 tablet by mouth       levothyroxine (SYNTHROID/LEVOTHROID) 137 MCG tablet Take 1 tablet (137 mcg) by mouth daily 90 tablet 3     saccharomyces boulardii (FLORASTOR) 250 MG capsule Take 250 mg by mouth daily       self discontinued) abilify about 1-1.5 months ago  Has another med from [facility] she takes prn panic attack - has used 3 times.     Allergies  No Known Allergies    Family History  family history includes Diabetes in her paternal uncle; Thyroid Cancer in her paternal grandmother; Thyroid Disease in her paternal aunt and paternal uncle.    Social History  Social History     Tobacco Use  "    Smoking status: Not on file   Substance Use Topics     Alcohol use: Not on file     Drug use: Not on file     Got a new job since last here; cooking at home a lot     Physical Exam  BP (!) 122/90   Pulse 80   Ht 1.6 m (5' 3\")   Wt 82.2 kg (181 lb 4.8 oz)   BMI 32.12 kg/m     Body mass index is 32.12 kg/m .  GENERAL :  Young woman In no apparent physical distress.    SKIN: Normal color, normal temperature, texture.    EYES: PER,  No scleral icterus,  No proptosis, conjunctival redness, stare, retraction  NECK: No visible masses. No palpable adenopathy, or masses.   THYROID:  Not palpable  RESP: Lungs clear to auscultation bilaterally  CARDIAC: Regular rate and rhythm, normal S1 S2, without murmurs, rubs or gallops        NEURO: awake, alert, responds to questions;Moves all extremities; Gait normal.  No tremor of the outstretched hand.  DTRs   1/4 ,   EXTREMITIES: No clubbing, cyanosis or edema.    DATA REVIEW    ENDO THYROID LABS-[facility] Latest Ref Rng & Units 12/10/2019 9/30/2019   TSH 0.40 - 4.00 mU/L 7.80 (H) 4.52 (H)   T4 FREE 0.76 - 1.46 ng/dL 1.14 1.33     ENDO THYROID LABS-[facility] Latest Ref Rng & Units 8/7/2019   TSH 0.40 - 4.00 mU/L 28.65 (H)   T4 FREE 0.76 - 1.46 ng/dL 0.94       Again, thank you for allowing me to participate in the care of your patient.      Sincerely,    Dior Benjamin MD      "

## 2020-03-11 ENCOUNTER — HEALTH MAINTENANCE LETTER (OUTPATIENT)
Age: 35
End: 2020-03-11

## 2020-04-02 ENCOUNTER — AMBULATORY - HEALTHEAST (OUTPATIENT)
Dept: LAB | Facility: CLINIC | Age: 35
End: 2020-04-02

## 2020-04-02 DIAGNOSIS — E03.8 OTHER SPECIFIED HYPOTHYROIDISM: ICD-10-CM

## 2020-04-02 LAB
T3 SERPL-MCNC: 90 NG/DL (ref 45–175)
T4 FREE SERPL-MCNC: 1.3 NG/DL (ref 0.7–1.8)
TSH SERPL DL<=0.005 MIU/L-ACNC: 6.14 UIU/ML (ref 0.3–5)

## 2020-12-30 DIAGNOSIS — E89.0 POSTABLATIVE HYPOTHYROIDISM: ICD-10-CM

## 2021-01-03 ENCOUNTER — HEALTH MAINTENANCE LETTER (OUTPATIENT)
Age: 36
End: 2021-01-03

## 2021-01-04 NOTE — TELEPHONE ENCOUNTER
levothyroxine (SYNTHROID/LEVOTHROID) 137 MCG tablet      Last Written Prescription Date:  12/10/19  Last Fill Quantity: 96,   # refills: 3  Last Office Visit : 12/10/19  Future Office visit:  None scheduled    Routing refill request to provider for review/approval because:  Provider preference  Last TSH 7.80 12/10/19

## 2021-01-05 DIAGNOSIS — E89.0 POSTABLATIVE HYPOTHYROIDISM: Primary | ICD-10-CM

## 2021-01-05 RX ORDER — LEVOTHYROXINE SODIUM 137 UG/1
TABLET ORAL
Qty: 96 TABLET | Refills: 1 | Status: SHIPPED | OUTPATIENT
Start: 2021-01-05

## 2021-04-25 ENCOUNTER — HEALTH MAINTENANCE LETTER (OUTPATIENT)
Age: 36
End: 2021-04-25

## 2021-05-28 NOTE — TELEPHONE ENCOUNTER
Called and relayed msg to pt. No further question.   Labs order placed and appt scheduled.     rx sent for signature.    none

## 2021-05-28 NOTE — TELEPHONE ENCOUNTER
----- Message from Sebastian Thompson MD sent at 4/29/2019  3:12 AM CDT -----  Please increase synthroid to 137 mcg daily. Check same labs in 3 months.

## 2021-05-29 NOTE — TELEPHONE ENCOUNTER
Pt appt cancelled and she want to transfer care to [facility] in [place]. I can't sent LORRI form to pt via [employer]. Pt will stop by today before 5 pm or tomorroe between 8-5pm to sign LORRI form.

## 2021-05-29 NOTE — TELEPHONE ENCOUNTER
Wil left a LORRI at the pick-up area for patient with her name on it in case  cannot find one when patient comes in.

## 2021-06-01 VITALS — BODY MASS INDEX: 29.4 KG/M2 | HEIGHT: 64 IN | WEIGHT: 172.2 LBS

## 2021-06-01 VITALS — WEIGHT: 165.3 LBS | HEIGHT: 64 IN | BODY MASS INDEX: 28.22 KG/M2

## 2021-06-01 VITALS — BODY MASS INDEX: 30.07 KG/M2 | HEIGHT: 64 IN | WEIGHT: 176.1 LBS

## 2021-06-02 VITALS — WEIGHT: 181.5 LBS | BODY MASS INDEX: 32.16 KG/M2 | HEIGHT: 63 IN

## 2021-06-08 ENCOUNTER — RECORDS - HEALTHEAST (OUTPATIENT)
Dept: ENDOCRINOLOGY | Facility: CLINIC | Age: 36
End: 2021-06-08

## 2021-06-08 DIAGNOSIS — E05.90 THYROTOXICOSIS, UNSPECIFIED WITHOUT THYROTOXIC CRISIS OR STORM: ICD-10-CM

## 2021-06-16 NOTE — PROGRESS NOTES
Progress Note    Reason for Visit:  Chief Complaint     Thyroid Problem          Progress Note:    HPI: This patient seen saltation at the request of the primary care physician because of post ablative hypothyroidism.    The patient said that she has developed Graves' disease back in 2011 than she received radioactive iodine ablation in the months later had a thyroid storm.    The patient was hospitalized.    She is currently on Synthroid 100 mcg daily plus Cytomel 50 mcg twice a day the patient said that she had panic-like an anxiety attack but now she is better.    She said that her she has no significant family history of thyroid disorders.    She is not  has 1 child that is 11 years old.    Main symptoms are fatigue tiredness shortness of breath hair loss and anxiety.    Most recent lab test TSH 0.11 free T4 0.87.    Review of Systems:    Nervous System: No headache, dizziness, fainting or memory loss. No tingling sensation of hand or feet.  Ears: No hearing loss or ringing in the ears  Eyes: No blurring of vision, redness, itching or dryness.  Nose: No nosebleed or loss of smell  Mouth: No mouth sores or loss of taste  Throat: No hoarseness or difficulty swallowing  Neck: No enlarged thyroid or lymph nodes.  Heart: No chest pain, palpitation or irregular heartbeat. No swelling of hands or feet  Lungs: No shortness of breath, cough, night sweats, wheezing or hemoptysis.  Gastrointestinal: No nausea or vomiting, constipation or diarrhea.  No acid reflux, abdominal pain or blood in stools.  Kidney/Bladdr: No polyuria, polydipsia, nocturia or hematuria.  Genital/Sexual: No loss of libido  Skin: No rash, hair loss or hirsutism.  No abnormal striae  Muscles/Joints/Bones: No morning stiffness, muscle aches and pain or loss of height.    Current Medications:  Current Outpatient Prescriptions   Medication Sig     levothyroxine (SYNTHROID, LEVOTHROID) 100 MCG tablet Take 1 tablet by mouth daily.     liothyronine  "(CYTOMEL) 5 MCG tablet Take 3 tablets by mouth 2 (two) times a day.       Patients Active Problems:  There is no problem list on file for this patient.      History:   reports that she has never smoked. She has never used smokeless tobacco.   reports that she has never smoked. She has never used smokeless tobacco. Her alcohol and drug histories are not on file.  History   Smoking Status     Never Smoker   Smokeless Tobacco     Never Used      reports that she has never smoked. She has never used smokeless tobacco. Her alcohol and drug histories are not on file.  History   Sexual Activity     Sexual activity: Not on file     No past medical history on file.  No family history on file.  No past medical history on file.  No past surgical history on file.    Vitals   height is 5' 3.5\" (1.613 m) and weight is 176 lb 1.6 oz (79.9 kg). Her blood pressure is 92/72.         Exam  General appearance: The patient looked well, not in acute distress.  Eyes: no evidence of thyroid eye disease.   Retinal exam: No evidence of diabetic retinopathy.  Mouth and Throat: Normal  Neck: No evidence of thyromegaly, enlarged lymph node or tenderness  Chest: Trachea is central. Chest is clear to auscultation and percussion. Breat sounds are normal.  Cardiovascular exam: JVP is not raised. Heart sounds are normal, no murmurs or rub  Peripheral pulses are palpable.   Abdomen: No masses or tenderness.    Back: No vertebral tenderness or kyphosis.  Extremities: No evidence of leg edema.   Skin: Normal to touch.  No abnormal striae  Neurologic exam:  Visual fields are intact by confrontation, grossly intact. No evidence of peripheral neuropathy.  Detailed foot exam normal.        Diagnosis:  No diagnosis found.    Orders:   No orders of the defined types were placed in this encounter.        Assessment and Plan:  Post ablative hypothyroidism I discussed the pathophysiology of the disease with the patient I reviewed her previous records I did " advise her to discontinue current Synthroid and Cytomel and we will put her on [place] Thyroid 75 mg daily we will continue checking her thyroid function tests regularly every 63 months I told her if she had any palpitation to stop it.    I discussed the pathophysiology of the disease with the patient in great details.    Routine health issue I did advise her to take vitamin D 2000 units a day.    Thyroid exam is normal.  She has no evidence of active Graves' eye disease minimal eye protrusion and lid stare.    I have reviewed and ordered clinical lab test    I have reviewed and ordered radiology tests.    I have reviewed and ordered her medication as required.    I have reviewed her test results and advised with the performing physician.    I have reviewed the patient's old records.    I have reviewed and summarized the patient old records.    I did spend 45 minutes with the patient more than 50% was spent on counseling and managing her care.

## 2021-06-18 NOTE — PROGRESS NOTES
Progress Note    Reason for Visit:  Chief Complaint     Thyroid Problem          Progress Note:    HPI:        I am seeing this pleasant 33-year-old female patient on emergent basis.        Component      Latest Ref Rng & Units 5/14/2018   Vitamin D, Total (25-Hydroxy)      30.0 - 80.0 ng/mL 28.6 (L)   TSH      0.30 - 5.00 uIU/mL 12.00 (H)   Free T4      0.7 - 1.8 ng/dL 0.7   T3, Total      45 - 175 ng/dL 77       Review of Systems:    Nervous System: No headache, dizziness, fainting or memory loss. No tingling sensation of hand or feet.  Ears: No hearing loss or ringing in the ears  Eyes: No blurring of vision, redness, itching or dryness.  Nose: No nosebleed or loss of smell  Mouth: No mouth sores or loss of taste  Throat: No hoarseness or difficulty swallowing  Neck: No enlarged thyroid or lymph nodes.  Heart: No chest pain, palpitation or irregular heartbeat. No swelling of hands or feet  Lungs: No shortness of breath, cough, night sweats, wheezing or hemoptysis.  Gastrointestinal: No nausea or vomiting, constipation or diarrhea.  No acid reflux, abdominal pain or blood in stools.  Kidney/Bladdr: No polyuria, polydipsia, nocturia or hematuria.  Genital/Sexual: No loss of libido  Skin: No rash, hair loss or hirsutism.  No abnormal striae  Muscles/Joints/Bones: No morning stiffness, muscle aches and pain or loss of height.    Current Medications:  Current Outpatient Prescriptions   Medication Sig     cholecalciferol, vitamin D3, (VITAMIN D3) 2,000 unit capsule Take 1,000 Units by mouth daily.     thyroid, pork, (ARMOUR THYROID) 15 mg Tab Take 1 tablet 15 mg with 1 tablet 60 mg ( total of 75 mg) on Monday, Wednesday and Friday.     thyroid, pork, (ARMOUR THYROID) 60 mg Tab Take 1 tablet 60 mg with 1 tablet 15 mg ( total of 75 mg) on Mon, Wed, and Friday. Take 60 mg on the other days.       Patients Active Problems:  Patient Active Problem List   Diagnosis     Hypothyroid     PTSD (post-traumatic stress disorder)  "      History:   reports that she has never smoked. She has never used smokeless tobacco.   reports that she has never smoked. She has never used smokeless tobacco. Her alcohol and drug histories are not on file.  History   Smoking Status     Never Smoker   Smokeless Tobacco     Never Used      reports that she has never smoked. She has never used smokeless tobacco. Her alcohol and drug histories are not on file.  History   Sexual Activity     Sexual activity: Not on file     No past medical history on file.  No family history on file.  No past medical history on file.  No past surgical history on file.    Vitals   height is 5' 3.5\" (1.613 m) and weight is 165 lb 4.8 oz (75 kg). Her blood pressure is 110/86.         Exam  General appearance: The patient looked well, not in acute distress.  Eyes: no evidence of thyroid eye disease.   Retinal exam: No evidence of diabetic retinopathy.  Mouth and Throat: Normal  Neck: No evidence of thyromegaly, enlarged lymph node or tenderness  Chest: Trachea is central. Chest is clear to auscultation and percussion. Breat sounds are normal.  Cardiovascular exam: JVP is not raised. Heart sounds are normal, no murmurs or rub  Peripheral pulses are palpable.   Abdomen: No masses or tenderness.    Back: No vertebral tenderness or kyphosis.  Extremities: No evidence of leg edema.   Skin: Normal to touch.  No abnormal striae  Neurologic exam:  Visual fields are intact by confrontation, grossly intact. No evidence of peripheral neuropathy.  Detailed foot exam normal.        Diagnosis:  No diagnosis found.    Orders:   No orders of the defined types were placed in this encounter.        Assessment and Plan: She had post ablative hypothyroidism back in 2011 she received radioactive iodine.    The patient tried Synthroid she did not feel well on that.    We put her on [place] Thyroid 75 mg daily.  The patient said that she had racing thoughts and panic-like attack and she could not stay still. "  I checked her that her thyroid function her TSH was 12 free T4 was 0.7.    Because she was markedly symptomatic I did advise her to cut down uRchi Thyroid to, 75 mg on Monday Wednesday Friday and the rest of the day she takes 60 mg.    I discussed with the patient in details that she was actually hypothyroid by lab test and her symptoms of hyperthyroidism or panic-like attacks and anxiety has nothing to do with her thyroid I did explain to her at length that she needs to follow-up with her psychiatrist to find out the bottom of these symptoms or to get at the bottom of the symptoms.    Right now she does feel fatigue and tired but at the same time she is having panic-like attacks insomnia is very hard for her to get going in the morning so I will check her thyroid function test today I will check her thyroid antibodies I would do thyroid ultrasound her thyroid is not enlarged at but I stressed to her that the symptoms are not related to the thyroid and should be addressed by her psychiatrist and/or other physicians.    And this should be coordinated by the primary care physician I will adjust her [place] Thyroid according to the lab test today we will check her thyroid every 3 month I filled out her FMLA 18 the end of July but after that she has to get it from her psychiatrist.  Patient will keep her September appointment.    I have reviewed and ordered clinical lab test    I have reviewed and ordered radiology tests.    I have reviewed and ordered her medication as required.    I have reviewed her test results and advised with the performing physician.    I have reviewed the patient's old records.    I have reviewed and summarized the patient old records.    I did spend 40 minutes with the patient more than 50% was spent on counseling and managing her care.

## 2021-06-18 NOTE — PROGRESS NOTES
Progress Note    Reason for Visit:  Chief Complaint     Thyroid Problem          Progress Note:    HPI:   I am seeing this patient on emergent basis.  The patient has post ablative hypothyroidism.  Review of Systems:    Nervous System: No headache, dizziness, fainting or memory loss. No tingling sensation of hand or feet.  Ears: No hearing loss or ringing in the ears  Eyes: No blurring of vision, redness, itching or dryness.  Nose: No nosebleed or loss of smell  Mouth: No mouth sores or loss of taste  Throat: No hoarseness or difficulty swallowing  Neck: No enlarged thyroid or lymph nodes.  Heart: No chest pain, palpitation or irregular heartbeat. No swelling of hands or feet  Lungs: No shortness of breath, cough, night sweats, wheezing or hemoptysis.  Gastrointestinal: No nausea or vomiting, constipation or diarrhea.  No acid reflux, abdominal pain or blood in stools.  Kidney/Bladdr: No polyuria, polydipsia, nocturia or hematuria.  Genital/Sexual: No loss of libido  Skin: No rash, hair loss or hirsutism.  No abnormal striae  Muscles/Joints/Bones: No morning stiffness, muscle aches and pain or loss of height.    Current Medications:  Current Outpatient Prescriptions   Medication Sig     thyroid, pork, (ARMOUR THYROID) 15 mg Tab Take 1 tablet 15 mg with 1 tablet 60 mg ( total of 75 mg)     thyroid, pork, (ARMOUR THYROID) 60 mg Tab Take 1 tablet 60 mg with 1 tablet 15 mg ( total of 75 mg)       Patients Active Problems:  Patient Active Problem List   Diagnosis     Hypothyroid       History:   reports that she has never smoked. She has never used smokeless tobacco.   reports that she has never smoked. She has never used smokeless tobacco. Her alcohol and drug histories are not on file.  History   Smoking Status     Never Smoker   Smokeless Tobacco     Never Used      reports that she has never smoked. She has never used smokeless tobacco. Her alcohol and drug histories are not on file.  History   Sexual Activity      "Sexual activity: Not on file     No past medical history on file.  No family history on file.  No past medical history on file.  No past surgical history on file.    Vitals   height is 5' 3.5\" (1.613 m) and weight is 172 lb 3.2 oz (78.1 kg). Her blood pressure is 118/72.         Exam  General appearance: The patient looked well, not in acute distress.  Eyes: no evidence of thyroid eye disease.   Retinal exam: No evidence of diabetic retinopathy.  Mouth and Throat: Normal  Neck: No evidence of thyromegaly, enlarged lymph node or tenderness  Chest: Trachea is central. Chest is clear to auscultation and percussion. Breat sounds are normal.  Cardiovascular exam: JVP is not raised. Heart sounds are normal, no murmurs or rub  Peripheral pulses are palpable.   Abdomen: No masses or tenderness.    Back: No vertebral tenderness or kyphosis.  Extremities: No evidence of leg edema.   Skin: Normal to touch.  No abnormal striae  Neurologic exam:  Visual fields are intact by confrontation, grossly intact. No evidence of peripheral neuropathy.  Detailed foot exam normal.        Diagnosis:  No diagnosis found.    Orders:   No orders of the defined types were placed in this encounter.        Assessment and Plan: Post ablative hypothyroidism.  The patient had Graves' disease and received radioactive iodine treatment 2011.    The patient was taken Synthroid 100 mcg plus Cytomel 5 mcg 2 tablets daily.  The patient was feeling fatigue and tired in the past.    I switch her to [place] Thyroid 75 mg daily she feels much better over her symptom has resolved with now she is feeling more towards the hyperthyroid symptoms with racing of thoughts but no palpitation.    I did advise the patient I will check a thyroid function test today I advised her to stop the [place] Thyroid for 2 days and then restarted after that at 60.    She has 1 child that is 11 years old.    She is passively trying to have a child and I did explain to her that it is " very important that we switch her to Synthroid instead of the arm or if she is planning to get pregnant the patient said that she will use contraception but for right now because [place] Thyroid is working for her she wanted for us to find out if she is pregnant or not and she will get the dose adjusted right I explained to her in details that the [place] Thyroid does not cross the placenta and she need to be on Synthroid if she is pregnant.  And she agreed to switch if she is positively pregnant.    Patient return to clinic in her appointment in September and we have we will check a thyroid function test every 6-8 weeks.    I have reviewed and ordered clinical lab test    I have reviewed and ordered radiology tests.    I have reviewed and ordered her medication as required.    I have reviewed her test results and advised with the performing physician.    I have reviewed the patient's old records.    I have reviewed and summarized the patient old records.    I did spend 40 minutes with the patient more than 50% was spent on counseling and managing her care.

## 2021-06-22 NOTE — PROGRESS NOTES
Progress Note    Reason for Visit:      Progress Note:    HPI:       She had post ablative hypothyroidism back in 2011 she received radioactive iodine.     The patient tried Synthroid she did not feel well on that.     We put her on [place] Thyroid 75 mg daily.  The patient said that she had racing thoughts and panic-like attack and she could not stay still.  I checked her that her thyroid function her TSH was 12 free T4 was 0.7.     Because she was markedly symptomatic I did advise her to cut down [place] Thyroid to, 75 mg on Monday Wednesday Friday and the rest of the day she takes 60 mg.     I discussed with the patient in details that she was actually hypothyroid by lab test and her symptoms of hyperthyroidism or panic-like attacks and anxiety has nothing to do with her thyroid I did explain to her at length that she needs to follow-up with her psychiatrist to find out the bottom of these symptoms or to get at the bottom of the symptoms.     Right now she does feel fatigue and tired but at the same time she is having panic-like attacks insomnia is very hard for her to get going in the morning so I will check her thyroid function test today I will check her thyroid antibodies I would do thyroid ultrasound her thyroid is not enlarged at but I stressed to her that the symptoms are not related to the thyroid and should be addressed by her psychiatrist and/or other physicians.      Component      Latest Ref Rng & Units 6/28/2018 9/4/2018 11/20/2018   Creatinine      0.60 - 1.10 mg/dL 0.75     GFR MDRD Af Amer      >60 mL/min/1.73m2 >60     GFR MDRD Non Af Amer      >60 mL/min/1.73m2 >60     Thyroid Peroxidase Ab      0.0 - 5.6 IU/mL 22.3 (H)     TSH Receptor Antibody      <=1.75 IU/L <0.90     Free T4      0.7 - 1.8 ng/dL 0.6 (L) 0.9 0.8   T3, Total      45 - 175 ng/dL 71 115 123   TSH      0.30 - 5.00 uIU/mL 28.97 (H) 6.37 (H) 7.02 (H)   Thyroid Stimulating Immunoglobulin      <=122 % 100     Potassium      3.5 - 5.0  mmol/L 4.4     Vitamin D, Total (25-Hydroxy)      30.0 - 80.0 ng/mL 30.1  25.6 (L)           Review of Systems:    Nervous System: No headache, dizziness, fainting or memory loss. No tingling sensation of hand or feet.  Ears: No hearing loss or ringing in the ears  Eyes: No blurring of vision, redness, itching or dryness.  Nose: No nosebleed or loss of smell  Mouth: No mouth sores or loss of taste  Throat: No hoarseness or difficulty swallowing  Neck: No enlarged thyroid or lymph nodes.  Heart: No chest pain, palpitation or irregular heartbeat. No swelling of hands or feet  Lungs: No shortness of breath, cough, night sweats, wheezing or hemoptysis.  Gastrointestinal: No nausea or vomiting, constipation or diarrhea.  No acid reflux, abdominal pain or blood in stools.  Kidney/Bladdr: No polyuria, polydipsia, nocturia or hematuria.  Genital/Sexual: No loss of libido  Skin: No rash, hair loss or hirsutism.  No abnormal striae  Muscles/Joints/Bones: No morning stiffness, muscle aches and pain or loss of height.    Current Medications:  Current Outpatient Medications   Medication Sig     ARMOUR THYROID 60 mg Tab Take 1 tablet (60 mg) with 1 tablet 15 mg (total of 75 mg) on Monday, Wednesday, and Friday. Take 60 mg on the other days..     cholecalciferol, vitamin D3, (VITAMIN D3) 2,000 unit capsule Take 1,000 Units by mouth daily.     levothyroxine (SYNTHROID, LEVOTHROID) 25 MCG tablet 25 mcg 2 tablets on Monday Wednesday Friday and the rest of the days take 1 tablet only.     thyroid, pork, (ARMOUR THYROID) 15 mg Tab Take 1 tablet 15 mg with 1 tablet 60 mg ( total of 75 mg) on Monday, Wednesday and Friday.       Patients Active Problems:  Patient Active Problem List   Diagnosis     Hypothyroid     PTSD (post-traumatic stress disorder)       History:   reports that  has never smoked. she has never used smokeless tobacco.   reports that  has never smoked. she has never used smokeless tobacco. Her alcohol and drug histories  are not on file.  Social History     Tobacco Use   Smoking Status Never Smoker   Smokeless Tobacco Never Used      reports that  has never smoked. she has never used smokeless tobacco. Her alcohol and drug histories are not on file.  Social History     Substance and Sexual Activity   Sexual Activity Not on file     No past medical history on file.  No family history on file.  No past medical history on file.  No past surgical history on file.    Vitals   vitals were not taken for this visit.         Exam  General appearance: The patient looked well, not in acute distress.  Eyes: no evidence of thyroid eye disease.   Retinal exam: No evidence of diabetic retinopathy.  Mouth and Throat: Normal  Neck: No evidence of thyromegaly, enlarged lymph node or tenderness  Chest: Trachea is central. Chest is clear to auscultation and percussion. Breat sounds are normal.  Cardiovascular exam: JVP is not raised. Heart sounds are normal, no murmurs or rub  Peripheral pulses are palpable.   Abdomen: No masses or tenderness.    Back: No vertebral tenderness or kyphosis.  Extremities: No evidence of leg edema.   Skin: Normal to touch.  No abnormal striae  Neurologic exam:  Visual fields are intact by confrontation, grossly intact. No evidence of peripheral neuropathy.  Detailed foot exam normal.        Diagnosis:  No diagnosis found.    Orders:   No orders of the defined types were placed in this encounter.        Assessment and Plan: This patient is here for follow-up because of hypothyroidism she is currently on [place] Thyroid 75 mg on Monday Wednesday Friday the rest of the days she takes 60 mg.    She was taken Synthroid 25 mcg but it was stopped that in November.    Her TSH last TSH is 7.02 free T4 0.8 total T3 is 120 3G.  We will check her thyroid function test today she is feeling fatigued and tired so he may be we will increase her [place] Thyroid to, 75 mcg daily.    Vitamin D deficiency vitamin D is 25 for develop vitamin D  50,000 units every 10 days.    Anxiety and depression she had a lot of anxiety and depression finally she is on Abilify 2 mg daily and that is helping her.    Patient will return to clinic in 6 months we will check a thyroid function test in 3 months thyroid exam is normal.    I have reviewed and ordered clinical lab test    I have reviewed and ordered her medication as required.    I have reviewed her test results and advised with the performing physician.    I have reviewed the patient's old records.    I have reviewed and summarized the patient old records.

## 2021-06-22 NOTE — TELEPHONE ENCOUNTER
----- Message from Sebastian Thompson MD sent at 1/1/2019  8:49 AM CST -----  You system is not accepting the armor thyroid at all, I advise that you stop it and start synthroid 125 mcg daily.

## 2021-06-24 NOTE — TELEPHONE ENCOUNTER
Who is calling:  patient  Reason for Call:  Patient is calling to schedule same day lab appointment at [facility] for Endocrinology lab orders.  Date of last appointment with primary care: 12/31/18 saw Dr. shen last  Has the patient been recently seen:  Yes  Okay to leave a detailed message: Not needed

## 2021-10-10 ENCOUNTER — HEALTH MAINTENANCE LETTER (OUTPATIENT)
Age: 36
End: 2021-10-10

## 2022-05-21 ENCOUNTER — HEALTH MAINTENANCE LETTER (OUTPATIENT)
Age: 37
End: 2022-05-21

## 2022-09-18 ENCOUNTER — HEALTH MAINTENANCE LETTER (OUTPATIENT)
Age: 37
End: 2022-09-18

## 2023-06-04 ENCOUNTER — HEALTH MAINTENANCE LETTER (OUTPATIENT)
Age: 38
End: 2023-06-04